# Patient Record
Sex: FEMALE | Race: WHITE
[De-identification: names, ages, dates, MRNs, and addresses within clinical notes are randomized per-mention and may not be internally consistent; named-entity substitution may affect disease eponyms.]

---

## 2022-11-02 ENCOUNTER — HOSPITAL ENCOUNTER (EMERGENCY)
Dept: HOSPITAL 75 - ER FS | Age: 77
Discharge: HOME | End: 2022-11-02
Payer: COMMERCIAL

## 2022-11-02 VITALS — HEIGHT: 62.99 IN | BODY MASS INDEX: 48.83 KG/M2 | WEIGHT: 275.58 LBS

## 2022-11-02 VITALS — SYSTOLIC BLOOD PRESSURE: 103 MMHG | DIASTOLIC BLOOD PRESSURE: 59 MMHG

## 2022-11-02 DIAGNOSIS — Z28.310: ICD-10-CM

## 2022-11-02 DIAGNOSIS — B37.2: Primary | ICD-10-CM

## 2022-11-02 LAB
APTT PPP: YELLOW S
BACTERIA #/AREA URNS HPF: NEGATIVE /HPF
BILIRUB UR QL STRIP: NEGATIVE
FIBRINOGEN PPP-MCNC: CLEAR MG/DL
GLUCOSE UR STRIP-MCNC: NEGATIVE MG/DL
HYALINE CASTS #/AREA URNS LPF: (no result) /LPF
KETONES UR QL STRIP: NEGATIVE
LEUKOCYTE ESTERASE UR QL STRIP: NEGATIVE
NITRITE UR QL STRIP: NEGATIVE
PH UR STRIP: 5 [PH] (ref 5–9)
PROT UR QL STRIP: NEGATIVE
RBC #/AREA URNS HPF: (no result) /HPF
SP GR UR STRIP: 1.01 (ref 1.02–1.02)
SQUAMOUS #/AREA URNS HPF: (no result) /HPF
WBC #/AREA URNS HPF: (no result) /HPF

## 2022-11-02 PROCEDURE — 81000 URINALYSIS NONAUTO W/SCOPE: CPT

## 2022-11-02 PROCEDURE — 99283 EMERGENCY DEPT VISIT LOW MDM: CPT

## 2022-11-02 NOTE — ED GU-FEMALE
General


Chief Complaint:   - Reproductive


Stated Complaint:  WEAKNESS,GI ISSUES


Nursing Triage Note:  


Pt presents with c/o bleeding with urination. Upon investigation pt has skin 


breakdown and wounds to buttock, herve, and genital regions. Wounds smell of 


yeast and are red and non blanching.


Source:  patient


Exam Limitations:  no limitations





History of Present Illness


Date Seen by Provider:  Nov 2, 2022


Time Seen by Provider:  19:30


Initial Comments


Patient is a 77-year-old female presents with intertrigo and bleeding around her

skin folds or groin.  Symptoms been ongoing for the past several days.  Patient 

with bright red and inflamed yeast infection with red swollen skin no fever 

chills or sweats.  No other symptoms or complaints


Timing/Duration:  just prior to arrival


Severity/Quality:  mild


Location:  other


Radiation:  other


Activities at Onset:  other


Sexual Glide History:  other


Modifying Factors:  Improves With Other


Associated Symptoms:  other





Allergies and Home Medications


Patient Home Medication List


Home Medication List Reviewed:  Yes





Review of Systems


Review of Systems


Constitutional:  see HPI


EENTM:  see HPI


Respiratory:  see HPI


Gastrointestinal:  see HPI


Genitourinary:  see HPI


Musculoskeletal:  see HPI


Skin:  see HPI


Psychiatric/Neurological:  See HPI


Endocrine:  See HPI


Hematologic/Lymphatic:  See HPI





All Other Systemes Reviewed


Negative Unless Noted:  No





Past Medical-Social-Family Hx


Patient Social History


Tobacco Use?:  No





Physical Exam


Vital Signs





Vital Signs - First Documented








 11/2/22





 19:25


 


Temp 36.7


 


Pulse 85


 


Resp 18


 


B/P (MAP) 102/78 (86)


 


Pulse Ox 100


 


O2 Delivery Room Air





Capillary Refill : Less Than 3 Seconds


Height, Weight, BMI


Height: '"


Weight: lbs. oz. kg; 48.00 BMI


Method:


General Appearance:  WD/WN, no apparent distress


HEENT:  PERRL/EOMI


Cardiovascular:  regular rate, rhythm


Respiratory:  lungs clear


Skin:  other (Intertrigo with yeast infection groin folds.)





Focused Exam


Sepsis Stage:  Ruled Out





Progress/Results/Core Measures


Suspected Sepsis


SIRS


Temperature: 


Pulse: 85 


Respiratory Rate: 18


 


Blood Pressure 102 /78 


Mean: 86





Results/Orders


Lab Results





Laboratory Tests








Test


 11/2/22


19:40 Range/Units


 








My Orders





Orders - IRENA BAPTISTE DO


Urinalysis (11/2/22 19:24)





Vital Signs/I&O











 11/2/22





 19:25


 


Temp 36.7


 


Pulse 85


 


Resp 18


 


B/P (MAP) 102/78 (86)


 


Pulse Ox 100


 


O2 Delivery Room Air





Capillary Refill : Less Than 3 Seconds








Blood Pressure Mean:                    86











Departure


Communication (Admissions)


Intertrigo





Impression





   Primary Impression:  


   Candidiasis, intertrigo


Disposition:  01 HOME, SELF-CARE


Condition:  Stable





Departure-Patient Inst.


Decision time for Depature:  20:03


Patient Instructions:  Intertrigo





Add. Discharge Instructions:  


Please keep areas clean and dry and covered.  Take newly prescribed medication 

as directed and follow-up with your PCP in 2 to 3 days for reevaluation.





All discharge instructions reviewed with patient and/or family. Voiced 

understanding.


Scripts


Fluconazole (Diflucan) 100 Mg Tablet


100 MG PO DAILY, #7 TAB


   Prov: IRENA BAPTISTE DO         11/2/22











IRENA BAPTISTE DO                    Nov 2, 2022 20:03

## 2023-04-04 ENCOUNTER — HOSPITAL ENCOUNTER (INPATIENT)
Dept: HOSPITAL 75 - ER FS | Age: 78
LOS: 3 days | Discharge: NURSING, REHAB OR LONG-TERM CARE | DRG: 871 | End: 2023-04-07
Attending: INTERNAL MEDICINE | Admitting: INTERNAL MEDICINE
Payer: MEDICAID

## 2023-04-04 VITALS — DIASTOLIC BLOOD PRESSURE: 63 MMHG | SYSTOLIC BLOOD PRESSURE: 122 MMHG

## 2023-04-04 VITALS — WEIGHT: 250.45 LBS | BODY MASS INDEX: 44.38 KG/M2 | HEIGHT: 62.99 IN

## 2023-04-04 DIAGNOSIS — L89.312: ICD-10-CM

## 2023-04-04 DIAGNOSIS — E78.00: ICD-10-CM

## 2023-04-04 DIAGNOSIS — F03.90: ICD-10-CM

## 2023-04-04 DIAGNOSIS — E86.0: ICD-10-CM

## 2023-04-04 DIAGNOSIS — R53.81: ICD-10-CM

## 2023-04-04 DIAGNOSIS — R65.21: ICD-10-CM

## 2023-04-04 DIAGNOSIS — M19.90: ICD-10-CM

## 2023-04-04 DIAGNOSIS — E83.42: ICD-10-CM

## 2023-04-04 DIAGNOSIS — M10.9: ICD-10-CM

## 2023-04-04 DIAGNOSIS — I82.412: ICD-10-CM

## 2023-04-04 DIAGNOSIS — N18.9: ICD-10-CM

## 2023-04-04 DIAGNOSIS — E66.9: ICD-10-CM

## 2023-04-04 DIAGNOSIS — A41.9: Primary | ICD-10-CM

## 2023-04-04 DIAGNOSIS — L03.116: ICD-10-CM

## 2023-04-04 DIAGNOSIS — L89.322: ICD-10-CM

## 2023-04-04 DIAGNOSIS — N17.9: ICD-10-CM

## 2023-04-04 LAB
ALBUMIN SERPL-MCNC: 3 GM/DL (ref 3.2–4.5)
ALP SERPL-CCNC: 194 U/L (ref 40–136)
ALT SERPL-CCNC: 10 U/L (ref 0–55)
APTT PPP: YELLOW S
BACTERIA #/AREA URNS HPF: NEGATIVE /HPF
BASOPHILS # BLD AUTO: 0.1 10^3/UL (ref 0–0.1)
BASOPHILS NFR BLD AUTO: 0 % (ref 0–10)
BASOPHILS NFR BLD MANUAL: 0 %
BILIRUB SERPL-MCNC: 1.1 MG/DL (ref 0.1–1)
BILIRUB UR QL STRIP: NEGATIVE
BUN/CREAT SERPL: 21
CALCIUM SERPL-MCNC: 8.8 MG/DL (ref 8.5–10.1)
CHLORIDE SERPL-SCNC: 100 MMOL/L (ref 98–107)
CO2 SERPL-SCNC: 27 MMOL/L (ref 21–32)
CREAT SERPL-MCNC: 2.41 MG/DL (ref 0.6–1.3)
EOSINOPHIL # BLD AUTO: 3.3 10^3/UL (ref 0–0.3)
EOSINOPHIL NFR BLD AUTO: 17 % (ref 0–10)
EOSINOPHIL NFR BLD MANUAL: 0 %
FIBRINOGEN PPP-MCNC: CLEAR MG/DL
GFR SERPLBLD BASED ON 1.73 SQ M-ARVRAT: 20 ML/MIN
GLUCOSE SERPL-MCNC: 142 MG/DL (ref 70–105)
GLUCOSE UR STRIP-MCNC: NEGATIVE MG/DL
HCT VFR BLD CALC: 35 % (ref 35–52)
HGB BLD-MCNC: 11.3 G/DL (ref 11.5–16)
KETONES UR QL STRIP: NEGATIVE
LEUKOCYTE ESTERASE UR QL STRIP: NEGATIVE
LYMPHOCYTES # BLD AUTO: 0.8 10^3/UL (ref 1–4)
LYMPHOCYTES NFR BLD AUTO: 4 % (ref 12–44)
MANUAL DIFFERENTIAL PERFORMED BLD QL: YES
MCH RBC QN AUTO: 29 PG (ref 25–34)
MCHC RBC AUTO-ENTMCNC: 32 G/DL (ref 32–36)
MCV RBC AUTO: 91 FL (ref 80–99)
MONOCYTES # BLD AUTO: 2.3 10^3/UL (ref 0–1)
MONOCYTES NFR BLD AUTO: 12 % (ref 0–12)
MONOCYTES NFR BLD: 6 %
NEUTROPHILS # BLD AUTO: 12.5 10^3/UL (ref 1.8–7.8)
NEUTROPHILS NFR BLD AUTO: 66 % (ref 42–75)
NEUTS BAND NFR BLD MANUAL: 87 %
NEUTS BAND NFR BLD: 3 %
NITRITE UR QL STRIP: NEGATIVE
PH UR STRIP: 5.5 [PH] (ref 5–9)
PLATELET # BLD: 270 10^3/UL (ref 130–400)
PMV BLD AUTO: 9.7 FL (ref 9–12.2)
POTASSIUM SERPL-SCNC: 4.2 MMOL/L (ref 3.6–5)
PROT SERPL-MCNC: 6.6 GM/DL (ref 6.4–8.2)
PROT UR QL STRIP: NEGATIVE
RBC #/AREA URNS HPF: (no result) /HPF
SODIUM SERPL-SCNC: 140 MMOL/L (ref 135–145)
SP GR UR STRIP: 1.02 (ref 1.02–1.02)
SQUAMOUS #/AREA URNS HPF: (no result) /HPF
VARIANT LYMPHS NFR BLD MANUAL: 4 %
WBC # BLD AUTO: 19 10^3/UL (ref 4.3–11)
WBC #/AREA URNS HPF: (no result) /HPF

## 2023-04-04 PROCEDURE — 82947 ASSAY GLUCOSE BLOOD QUANT: CPT

## 2023-04-04 PROCEDURE — 83735 ASSAY OF MAGNESIUM: CPT

## 2023-04-04 PROCEDURE — 73552 X-RAY EXAM OF FEMUR 2/>: CPT

## 2023-04-04 PROCEDURE — 81000 URINALYSIS NONAUTO W/SCOPE: CPT

## 2023-04-04 PROCEDURE — 87040 BLOOD CULTURE FOR BACTERIA: CPT

## 2023-04-04 PROCEDURE — 82533 TOTAL CORTISOL: CPT

## 2023-04-04 PROCEDURE — 86141 C-REACTIVE PROTEIN HS: CPT

## 2023-04-04 PROCEDURE — 80202 ASSAY OF VANCOMYCIN: CPT

## 2023-04-04 PROCEDURE — 85007 BL SMEAR W/DIFF WBC COUNT: CPT

## 2023-04-04 PROCEDURE — 85027 COMPLETE CBC AUTOMATED: CPT

## 2023-04-04 PROCEDURE — 87081 CULTURE SCREEN ONLY: CPT

## 2023-04-04 PROCEDURE — 82805 BLOOD GASES W/O2 SATURATION: CPT

## 2023-04-04 PROCEDURE — 73590 X-RAY EXAM OF LOWER LEG: CPT

## 2023-04-04 PROCEDURE — 36600 WITHDRAWAL OF ARTERIAL BLOOD: CPT

## 2023-04-04 PROCEDURE — 36415 COLL VENOUS BLD VENIPUNCTURE: CPT

## 2023-04-04 PROCEDURE — 84100 ASSAY OF PHOSPHORUS: CPT

## 2023-04-04 PROCEDURE — 71045 X-RAY EXAM CHEST 1 VIEW: CPT

## 2023-04-04 PROCEDURE — 80053 COMPREHEN METABOLIC PANEL: CPT

## 2023-04-04 PROCEDURE — 83605 ASSAY OF LACTIC ACID: CPT

## 2023-04-04 PROCEDURE — 85025 COMPLETE CBC W/AUTO DIFF WBC: CPT

## 2023-04-04 PROCEDURE — 51702 INSERT TEMP BLADDER CATH: CPT

## 2023-04-04 PROCEDURE — 87186 SC STD MICRODIL/AGAR DIL: CPT

## 2023-04-04 PROCEDURE — 84443 ASSAY THYROID STIM HORMONE: CPT

## 2023-04-04 RX ADMIN — Medication SCH MLS/HR: at 16:43

## 2023-04-04 RX ADMIN — SODIUM CHLORIDE SCH MLS/HR: 900 INJECTION, SOLUTION INTRAVENOUS at 18:21

## 2023-04-04 RX ADMIN — SODIUM CHLORIDE SCH MLS/HR: 900 INJECTION INTRAVENOUS at 20:23

## 2023-04-04 RX ADMIN — DOCUSATE SODIUM SCH MG: 100 CAPSULE ORAL at 20:23

## 2023-04-04 RX ADMIN — DOCUSATE SODIUM SCH MG: 100 CAPSULE ORAL at 20:26

## 2023-04-04 NOTE — DIAGNOSTIC IMAGING REPORT
CLINICAL HISTORY: Left leg pain.



COMPARISON: None.



TECHNIQUE: 2 views of the left tibia and fibula.



FINDINGS: 

There is no acute fracture or dislocation of the left tibia and

fibula.  Alignment is anatomic.  The imaged joint spaces are

preserved. No focal osseous lesions are seen. There is

generalized soft tissue edema in the left lower extremity.



IMPRESSION: 

1. No acute fracture or dislocation in the left tibia and fibula.

2. Soft tissue edema in the left lower extremity.



Dictated by: 



  Dictated on workstation # [id number]

## 2023-04-04 NOTE — TELE-ICU CONSULT
History of Present Illness


History of Present Illness


Date Seen by Provider:  Apr 4, 2023


Time Seen by Provider:  17:18


Date of Admission





(Tele-ICU Physician ,   consultation as per request of PCP 


Service provided via interactive audio and video telecommunications  [facility] 

system to a patient admitted to ICU bed in [facility].








Available chart/ vitals / labs / Images reviewed


H&P is from ER notes


Patient's information available about PMH, Shx, Fhx   allergy reviewed inEMR.


ROS as per chart and RN report





Now in ICU, hemodynamically stable


Video assessment done using   teleICU camera, rest of exam as per RN


Discussed with RN.








Hospital course:


(4/4) 78F Admitted from [facility] for cellulitis LLE, non-occlusive DVT LLE,

decubitus ulcer stage II to gluteal cleft. BP low on arrival to ICU








A/P


Sepsis  with shock ( sourses: cellulitis , decubitus ulcer .  UA is clear , no 

cxr done 


- received  IVF total of 1 Liters of NS, ongoing 2nd  1L NS , also in levo ( 

periph line 


- abx started - Vancomycin and Zosyn, cx done in ER - pending 


- will reassess after NS bolus , night need  central line 





Cellulitis 


- cont abx 





ADRIANA


- hydration to cont


- scherer in place  - UO  minimal , monitor after NS bolus 





Acute DVT  LEFT , moderate burden , nonocclusive clots


- lovenox  full  dose , started in ER  - adjust to Cr 





Lines :    periph   , (Central Line Necessity Reviewed)


Scherer:  4/4 


OG:


Nutrition: po 


Analgesia:


Anxiety/ delirium 








VTE Prophylaxis: lov full dose 


Stress Ulcer Prophylaxis:  po 








Plans in collaboration with   bedside consultants and IM MDs.


Discussed with RN to reach out if any questions or concerns


A total of 32  minutes of critical care time was devoted to this patient today, 

required to treat and/or prevent further deterioration of  critical care c

ondition ( as above ) .





I am remotely monitoring this patient from another state.  I am unable to do the

bedside exam, and history/physical and pertinent information is taken from other

notes in the computer and bedside staff. .





Allergies and Home Medications


Allergies


Coded Allergies:  


     No Known Drug Allergies (Unverified , 11/2/22)





Home Medications


Fluconazole 100 Mg Tablet, 100 MG PO DAILY


   Prescribed by: IRENA BAPTISTE on 11/2/22 2008


Hydrocodone/Acetaminophen 5 Mg-325 Mg Tablet, 1 TAB PO Q4H PRN for PAIN-MODERATE

(5-7)


   Prescribed by: IRENA BAPTISTE on 12/3/22 1007


Hydrocodone/Acetaminophen 5 Mg-325 Mg Tablet, 1 TAB PO Q4H PRN for PAIN-MODERATE

(5-7)


   Prescribed by: IRENA BAPTISTE on 11/2/22 2027





Past Medical/Social/Family Hx


Patient Social History


Tobacco Use?:  No


Substance use?:  No


Alcohol Use?:  No


Pt stated abuse/neglect:  No





Immunizations Up To Date


Influenza Vaccine Up-to-Date:  Yes; Up-to-Date


Tetanus Booster (TDap):  Unknown





Current Status


Advance Directives:  Unable to obtain


Communicates:  Verbally


Primary Language:  English


Preferred Spoken Language:  English


Is interpretation needed?:  No


Sensory deficits:  Vision impairment


Implanted or [employer] D:  None





Review of Systems


Constitutional:  see HPI





Focused Exam


Sepsis Stage:  Septic Shock


Lactate Level


4/4/23 14:10: Lactic Acid Level 1.62


Height, Weight, BMI


Height: '"


Weight: lbs. oz. kg; 42.14 BMI


Method:


Respiratory:  Lungs Clear, Decreased Breath Sounds


Cardiovascular:  Regular Rate, Rhythm, Other


Lactic Acid Level





Laboratory Tests








Test


 4/4/23


14:10


 


Lactic Acid Level


 1.62 MMOL/L


(0.50-2.00)








Within 3hrs of presentation:  Admin fluids, Admin ABX, Blood cultures prior to 

ABX's, Focus exam, Lactate level, Vasopressin therapy





Exam


Exam


Patient acknowledged, consented, and participated in this virtual visit which 

was conducted using real time audio/video


Vital Signs








  Date Time  Temp Pulse Resp B/P (MAP) Pulse Ox O2 Delivery O2 Flow Rate FiO2


 


4/4/23 16:43 37.0       


 


4/4/23 16:43    77/40    


 


4/4/23 15:25  96 18 114/91 99 Room Air  


 


4/4/23 12:18 37.6 113 20 112/73 (86) 95 Room Air  








Height & Weight


Height: '"


Weight: lbs. oz. kg; 42.14 BMI


Method:


General Appearance:  No Apparent Distress


Capillary Refill:  Less Than 3 Seconds


Gastrointestinal:  normal bowel sounds, non tender, soft, no pulsatile mass





Results


Lab


Laboratory Tests


4/4/23 12:55











Assessment/Plan


Assessment/Plan


1











THIERNO CARRANZA MD          Apr 4, 2023 17:19

## 2023-04-04 NOTE — DIAGNOSTIC IMAGING REPORT
CLINICAL HISTORY: Left leg pain and swelling. Injury.



COMPARISON: None.



TECHNIQUE: 3 views of the left femur.



FINDINGS: 

There is no acute fracture or dislocation of the left femur. 

Alignment is anatomic.  The imaged joint spaces are preserved. No

suspicious focal osseous lesion. There is generalized edema in

the left lower extremity.



IMPRESSION: 

1. No acute fracture or dislocation in the left femur. Please

note the left knee is not well characterized on this exam. If

there is localized left knee pain consider dedicated radiographs

of the left knee to further evaluate.

2. Edema in the visualized left lower extremity.



Dictated by: 



  Dictated on workstation # [id number]

## 2023-04-04 NOTE — ED LOWER EXTREMITY
General


Chief Complaint:  Lower Extremity


Stated Complaint:  LEFT KNEE/LEG PAIN


Nursing Triage Note:  


Patient presents to the ED by EMS for chief complaint of left lower extremity 


redness, swelling, and pain. Patient lives at home alone with caregiver support 


during the day according to patient's niece. Patient's niece states patient was 


unable to ambulate or transfer this morning due to the pain. Niece states 


patient has had gout in the past, but was unable to refill her gout medication 


without seeing her PCP.


Source:  patient





History of Present Illness


Date Seen by Provider:  2023


Time Seen by Provider:  12:14


Initial Comments


78-year-old female presenting by EMS from home due to severe left leg pain and 

swelling.  Family has not come to check on her and reported that she was having 

so much pain that she could not get up and walk so they had called EMS.  They 

had called the [facility] clinic to request a refill on medication for gout but had not

had it approved through the clinic yet.  The niece states that patient has had 

gout in the past and they were thinking that might be why she was having the 

pain.  Patient gets around with a walker and lives at home on her own but has 

family to check on her and provide caregiver support. She follows with PARMJIT Du, through [facility] clinic. Daughter reports she has a sore on her bottom 

and the daughter has been applying a barrier ointment to the area.


Onset:  this morning (severe left leg pain)


Pain/Injury Location:  left leg, left knee, left foot, left ankle


Method of Injury:  unknown


Modifying Factors:  Worse With Movement (with any movement or palpation of left 

leg she screams out in pain)





Allergies and Home Medications


Allergies


Coded Allergies:  


     No Known Drug Allergies (Unverified , 22)





Patient Home Medication List


Home Medication List Reviewed:  Yes


Fluconazole (Diflucan) 100 Mg Tablet, 100 MG PO DAILY


   Prescribed by: IRENA BAPTISTE on 22


Hydrocodone/Acetaminophen (Hydrocodone-Acetamin 5-325 mg) 5 Mg-325 Mg Tablet, 1 

TAB PO Q4H PRN for PAIN-MODERATE (5-7)


   Prescribed by: IRENA BAPTISTE on 12/3/22 1007


Hydrocodone/Acetaminophen (Hydrocodone-Acetamin 5-325 mg) 5 Mg-325 Mg Tablet, 1 

TAB PO Q4H PRN for PAIN-MODERATE (5-7)


   Prescribed by: IRENA BAPTISTE on 22





Review of Systems


Constitutional:  No chills, No fever; malaise, weakness


EENTM:  no symptoms reported


Respiratory:  no symptoms reported


Cardiovascular:  no symptoms reported


Gastrointestinal:  no symptoms reported


Genitourinary:  No dysuria


Musculoskeletal:  see HPI


Skin:  change in color (mild erythema to left foot and calf)


Psychiatric/Neurological:  Anxiety





Past Medical-Social-Family Hx


Patient Social History


Tobacco Use?:  No


Substance use?:  No


Alcohol Use?:  No


Pt feels they are or have been:  No





Past Medical History


Surgery/Hospitalization HX:  


Gout, HTN, Hypothyroid, Hyperlipidemia, Osteoarthritis, Obesity





Physical Exam


Vital Signs





Vital Signs - First Documented








 23





 12:18


 


Temp 37.6


 


Pulse 113


 


Resp 20


 


B/P (MAP) 112/73 (86)


 


Pulse Ox 95


 


O2 Delivery Room Air





Capillary Refill : Less Than 3 Seconds


Height, Weight, BMI


Height: '"


Weight: lbs. oz. kg; 48.00 BMI


Method:


General Appearance:  obese, other (disheveled and poor personal hygiene)


HEENT:  PERRL/EOMI; No pharynx normal (slightly dry mucous membranes)


Neck:  non-tender, full range of motion, supple


Cardiovascular:  normal peripheral pulses, regular rate, rhythm


Respiratory:  chest non-tender, lungs clear, normal breath sounds


Gastrointestinal:  normal bowel sounds, non tender, soft, no pulsatile mass


Hips:  bilateral hip non-tender, bilateral hip normal inspection; left hip pain 

(pain with any movement or palpation of left leg )


Legs:  left leg pain, left leg soft tissue tenderness, left leg swelling


Knees:  left knee pain, left knee soft tissue tenderness, left knee swelling


Ankles:  left ankle pain, left ankle soft tissue tenderness, left ankle swelling


Feet:  left foot pain, left foot soft tissue tenderness, left foot swelling


Neurologic/Psychiatric:  alert, oriented x 3


Skin:  warm/dry, other (increased erythema to Left lower extremity and tender to

palpation and movement of left leg. Stage 2 Decubitus ulcer to gluteal cleft 

with erythema and friable tissue )





Progress/Results/Core Measures


Results/Orders


Lab Results





Laboratory Tests








Test


 23


12:55 23


13:40 23


14:10 Range/Units


 


 


White Blood Count


 19.0 H


 


 


 4.3-11.0


10^3/uL


 


Red Blood Count


 3.89 


 


 


 3.80-5.11


10^6/uL


 


Hemoglobin 11.3 L   11.5-16.0  g/dL


 


Hematocrit 35    35-52  %


 


Mean Corpuscular Volume 91    80-99  fL


 


Mean Corpuscular Hemoglobin 29    25-34  pg


 


Mean Corpuscular Hemoglobin


Concent 32 


 


 


 32-36  g/dL





 


Red Cell Distribution Width 15.4 H   10.0-14.5  %


 


Platelet Count


 270 


 


 


 130-400


10^3/uL


 


Mean Platelet Volume 9.7    9.0-12.2  fL


 


Immature Granulocyte % (Auto) 1     %


 


Neutrophils (%) (Auto) 66    42-75  %


 


Lymphocytes (%) (Auto) 4 L   12-44  %


 


Monocytes (%) (Auto) 12    0-12  %


 


Eosinophils (%) (Auto) 17 H   0-10  %


 


Basophils (%) (Auto) 0    0-10  %


 


Neutrophils # (Auto)


 12.5 H


 


 


 1.8-7.8


10^3/uL


 


Lymphocytes # (Auto)


 0.8 L


 


 


 1.0-4.0


10^3/uL


 


Monocytes # (Auto)


 2.3 H


 


 


 0.0-1.0


10^3/uL


 


Eosinophils # (Auto)


 3.3 H


 


 


 0.0-0.3


10^3/uL


 


Basophils # (Auto)


 0.1 


 


 


 0.0-0.1


10^3/uL


 


Immature Granulocyte # (Auto)


 0.1 


 


 


 0.0-0.1


10^3/uL


 


Neutrophils % (Manual) 87     %


 


Lymphocytes % (Manual) 4     %


 


Monocytes % (Manual) 6     %


 


Eosinophils % (Manual) 0     %


 


Basophils % (Manual) 0     %


 


Band Neutrophils 3     %


 


Sodium Level 140    135-145  MMOL/L


 


Potassium Level 4.2    3.6-5.0  MMOL/L


 


Chloride Level 100      MMOL/L


 


Carbon Dioxide Level 27    21-32  MMOL/L


 


Anion Gap 13    5-14  MMOL/L


 


Blood Urea Nitrogen 51 H   7-18  MG/DL


 


Creatinine


 2.41 H


 


 


 0.60-1.30


MG/DL


 


Estimat Glomerular Filtration


Rate 20 


 


 


  





 


BUN/Creatinine Ratio 21     


 


Glucose Level 142 H     MG/DL


 


Calcium Level 8.8    8.5-10.1  MG/DL


 


Corrected Calcium 9.6    8.5-10.1  MG/DL


 


Total Bilirubin 1.1 H   0.1-1.0  MG/DL


 


Aspartate Amino Transf


(AST/SGOT) 20 


 


 


 5-34  U/L





 


Alanine Aminotransferase


(ALT/SGPT) 10 


 


 


 0-55  U/L





 


Alkaline Phosphatase 194 H     U/L


 


C-Reactive Protein 16.17 H   <0.50  MG/DL


 


Total Protein 6.6    6.4-8.2  GM/DL


 


Albumin 3.0 L   3.2-4.5  GM/DL


 


Urine Color  YELLOW    


 


Urine Clarity  CLEAR    


 


Urine pH  5.5   5-9  


 


Urine Specific Gravity  1.020   1.016-1.022  


 


Urine Protein  NEGATIVE   NEGATIVE  


 


Urine Glucose (UA)  NEGATIVE   NEGATIVE  


 


Urine Ketones  NEGATIVE   NEGATIVE  


 


Urine Nitrite  NEGATIVE   NEGATIVE  


 


Urine Bilirubin  NEGATIVE   NEGATIVE  


 


Urine Urobilinogen  0.2   < = 1.0  MG/DL


 


Urine Leukocyte Esterase  NEGATIVE   NEGATIVE  


 


Urine RBC (Auto)  NEGATIVE   NEGATIVE  


 


Urine RBC  NONE    /HPF


 


Urine WBC  0-2    /HPF


 


Urine Squamous Epithelial


Cells 


 NONE 


 


  /HPF





 


Urine Crystals  NONE    /LPF


 


Urine Bacteria  NEGATIVE    /HPF


 


Urine Casts  NONE    /LPF


 


Urine Mucus  NEGATIVE    /LPF


 


Urine Culture Indicated  NO    


 


Lactic Acid Level


 


 


 1.62 


 0.50-2.00


MMOL/L








My Orders





Orders - TAZ GRANDA MD


Cbc With Automated Diff (23 12:24)


Comprehensive Metabolic Panel (23 12:24)


Ua Culture If Indicated (23 12:24)


Ed Iv/Invasive Line Start (23 12:24)


Crp Fs (23 12:24)


Tibia Fibula 2 View Left (23 12:24)


Femur 2 View Left (23 12:24)


Us Venous Lower Ext Lt (23 12:24)


Ketorolac Injection (Toradol Injection) (23 12:28)


Manual Differential (23 12:55)


Blood Culture (23 13:52)


Lactic Acid Analyzer (23 13:52)


Piperacillin Sodium/Tazobactam (Zosyn Vi (23 13:54)


Vancomycin Injection (Vancomycin Injecti (23 13:54)


Ns Iv 1000 Ml (Sodium Chloride 0.9%) (23 14:06)


Ns Iv 1000 Ml (Sodium Chloride 0.9%) (23 14:43)


Enoxaparin Injection (Lovenox Injection) (23 14:43)


Kwan Cath (23 15:15)





Medications Given in ED





Current Medications








 Medications  Dose


 Ordered  Sig/Martina


 Route  Start Time


 Stop Time Status Last Admin


Dose Admin


 


 Sodium Chloride  1,000 ml @ 


 ud  STK-MED ONCE


 .ROUTE  23 14:06


 23 14:10 DC 23 14:27


1,000 MLS/HR








Vital Signs/I&O











 23





 12:18 15:25


 


Temp 37.6 


 


Pulse 113 96


 


Resp 20 18


 


B/P (MAP) 112/73 (86) 114/91


 


Pulse Ox 95 99


 


O2 Delivery Room Air Room Air














Blood Pressure Mean:                    86











Admisison Planning


May Need Admission (Planning):  12:30





Progress


Progress Note #1:  


Progress Note


Potential diagnosis of left hip fracture, arthritis of the left knee, 

cellulitis, DVT, ankle fracture, tibia/fibula fracture, sepsis, UTI, decubitus 

ulcer infection.





Peripheral IV access for fluids and pain medicine.  Obtain labs to evaluate her 

complete blood count, comprehensive metabolic profile, CRP.  X-rays of the left 

femur and left tib-fib to evaluate for possible acute bony abnormality as she 

could be having referred pain from hip fracture or could have fractured the left

lower leg to cause her pain to the point that she was not getting up and using 

her walker.  Since she does have increased swelling and mild erythema to the 

left lower extremity as well as pain with palpation will order ultrasound to 

evaluate for possible DVT.  Place Kwan catheter to monitor urine output and 

evaluate for UTI as well as due to pain with any movement of the left leg. Order

dose of Toradol 15 mg IV to try and help with pain and inflammation.


Progress Note #2:  


   Time:  13:39


Progress Note


Complete blood count shows elevated white blood cells to 19,000.  She has mild 

anemia with a hemoglobin of 11.3.  Normal platelets of 270.  For her 

differential she had 87% neutrophils and 3% bands.  She had comprehensive 

metabolic profile that showed acute kidney injury with BUN of 51 and creatinine 

of 2.41.  Her glucose was slightly elevated to 142.  Awaiting CRP.  We will add 

on blood cultures and lactic acid since she has a high white blood cell count.  

Potential source from decubitus ulcer, UTI, cellulitis.





Called and discussed with Dr. Meyers on-call hospitalist for [facility].  With the 

patient having severe pain with any movement or palpation in the left leg as 

well as having a DVT found on ultrasound with no bony injury on x-rays and 

possible infection from her decubitus ulcer, she was agreeable to having patient

be admitted as observation patient for hydration, antibiotics and DVT treatment.

Will order a dose of Lovenox of 120 mg x 1. 





1352 After i had initially spoken with Dr. Tripp the patient's blood pressures 

were reading low at 70-85 systolic. Patient still alert and awake and able to 

answer questions. She wanted to change the patient to ICU admit and full 

admission in light of these findings. Make sure to get blood cultures and lactic

acid prior to starting antibiotics. Start on Vancomycin and Zosyn for broad 

spectrum coverage.





I updated patient and family member. They were agreeable to admit in [place]. 

Ordered IVF total of 2 Liters of NS for blood pressure and hydration.


Progress Note #3:  


   Time:  15:06


Progress Note


CRP elevated to 16.17 but Lactic acid not elevated at 1.62. With IVF hydration 

blood pressure up to 114/67. continue with ICU admit to [facility].





Diagnostic Imaging





   Diagonstic Imaging:  Xray


   Plain Films/CT/US/NM/MRI:  femur


Comments


                 [facility] [place], [place]





NAME:   SPIKE GARCIA


[facility] REC#:   [id number]


ACCOUNT#:   [id number]


PT STATUS:   REG ER


:   1945


PHYSICIAN:   TAZ GRANDA MD


ADMIT DATE:   23/ER FS


                                  ***Signed***


Date of Exam:23





FEMUR 2 VIEW LEFT








CLINICAL HISTORY: Left leg pain and swelling. Injury.





COMPARISON: None.





TECHNIQUE: 3 views of the left femur.





FINDINGS: 


There is no acute fracture or dislocation of the left femur. 


Alignment is anatomic.  The imaged joint spaces are preserved. No


suspicious focal osseous lesion. There is generalized edema in


the left lower extremity.





IMPRESSION: 


1. No acute fracture or dislocation in the left femur. Please


note the left knee is not well characterized on this exam. If


there is localized left knee pain consider dedicated radiographs


of the left knee to further evaluate.


2. Edema in the visualized left lower extremity.





Dictated by: 





  Dictated on workstation # [id number]








Dict:   23 1301


Trans:   23 1306


DEV [id number]





Interpreted by:     SOCORRO FORD DO


Electronically signed by: SOCORRO FORD DO 23 130








   Diagonstic Imaging:  Xray


   Plain Films/CT/US/NM/MRI:  leg


Comments


                 [facility] [place], [place]





NAME:   SPIKE GARCIA


[facility] REC#:   [id number]


ACCOUNT#:   [id number]


PT STATUS:   REG ER


:   1945


PHYSICIAN:   TAZ GRANDA MD


ADMIT DATE:   23/ER FS


                                  ***Signed***


Date of Exam:23





TIBIA FIBULA 2 VIEW LEFT








CLINICAL HISTORY: Left leg pain.





COMPARISON: None.





TECHNIQUE: 2 views of the left tibia and fibula.





FINDINGS: 


There is no acute fracture or dislocation of the left tibia and


fibula.  Alignment is anatomic.  The imaged joint spaces are


preserved. No focal osseous lesions are seen. There is


generalized soft tissue edema in the left lower extremity.





IMPRESSION: 


1. No acute fracture or dislocation in the left tibia and fibula.


2. Soft tissue edema in the left lower extremity.





Dictated by: 





  Dictated on workstation # [id number]








Dict:   23 1259


Trans:   23 1303


DEV [id number]





Interpreted by:     SOCORRO FORD DO


Electronically signed by: SOCORRO FORD DO 23 1303


   Reviewed:  Reviewed by Me








   Diagonstic Imaging:  Ultrasound


   Plain Films/CT/US/NM/MRI:  leg


Comments


                 [facility] [place], [place]





NAME:   SPIKE GARCIA


[facility] REC#:   [id number]


ACCOUNT#:   [id number]


PT STATUS:   REG ER


:   1945


PHYSICIAN:   TAZ GRANDA MD


ADMIT DATE:   23/ER FS


                                  ***Signed***


Date of Exam:23





US VENOUS LOWER EXT LT








EXAMINATION: US Lower Extremity Venous Duplex Left.





TECHNIQUE: Multiple real-time grayscale images were obtained over


the left lower extremity in various projections. Additional


spectral analysis and color Doppler duplex images were also


obtained. 





HISTORY:  Left lower extremity pain and edema.





COMPARISON: None available.





FINDINGS: 





There is a nonocclusive thrombus within the left common femoral


vein which demonstrates partial compressibility. There is normal


color Doppler filling and compressibility within the profunda


femoris vein and proximal portion of the left superficial femoral


vein. Nonocclusive thrombus is visualized in the mid and distal


left superficial femoral vein and left popliteal vein. There is


occlusion of the left peroneal trunk.





IMPRESSION:





1. Moderate burden of nonocclusive deep vein thrombus in the left


lower extremity venous system.





Dictated by: 





  Dictated on workstation # [id number]








Dict:   23 1335


Trans:   23 1344


[facility] [id number]





Interpreted by:     SOCORRO FORD DO


Electronically signed by: SOCORRO FORD DO 23 1344


   Reviewed:  Reviewed by Me





Critical Care Note


Critical Care


Total Time (minutes)


45 minutes


Progress


I spent at least 45 minutes of critical care time with the patient.  Time 

excludes separately billable procedures.  Time was spent obtaining history from 

patient and family members, ordering tests and reviewing results, ordering 

interventions and reviewing response, discussion with consultants, documentation

in the chart.  Patient was at risk of cardiovascular compromise and collapse 

with sepsis and DVT.  She required immediate and direct care to help stabilize 

her condition and arrange transfer to higher level of care.





Departure


Communication (Admissions)


Time/Spoke to Admitting Phy:  13:52


discussed with Dr. Tripp that patient has low blood pressure and findings for 

possible sepsis with septic shock due to her pressures. Potential source of 

infection as Decubitus ulcer vs possible cellulitis LLE, DVT LLE. Labs show 

elevated WBC count and Acute kidney injury with dehydration. Will place patient 

as full admit to ICU with IV antibiotics of Zosyn 4.5 grams IV and Vancomycin 1 

gram IV. Give IVF for hydration and to help with blood pressure.





Impression





   Primary Impression:  


   Sepsis


   Qualified Codes:  A41.9 - Sepsis, unspecified organism; R65.21 - Severe 

   sepsis with septic shock; N17.9 - Acute kidney failure, unspecified


   Additional Impressions:  


   Deep vein thrombosis (DVT) of left lower extremity


   Qualified Codes:  I82.492 - Acute embolism and thrombosis of other specified 

   deep vein of left lower extremity


   Decubitus ulcer of buttock


   Qualified Codes:  L89.302 - Pressure ulcer of unspecified buttock, stage 2


   Dehydration


   Acute kidney injury (nontraumatic)


Disposition:  30 STILL A PATIENT


Condition:  Critical





Admissions


Decision to Admit Reason:  Admit from ER (General)


Decision to Admit/Date:  2023


Time/Decision to Admit Time:  13:52





Departure-Patient Inst.


Referrals:  


BASIL JOSEPH APRN (PCP/Family)


Primary Care Physician











TAZ GRANDA MD                2023 14:07

## 2023-04-04 NOTE — DIAGNOSTIC IMAGING REPORT
EXAMINATION: US Lower Extremity Venous Duplex Left.



TECHNIQUE: Multiple real-time grayscale images were obtained over

the left lower extremity in various projections. Additional

spectral analysis and color Doppler duplex images were also

obtained. 



HISTORY:  Left lower extremity pain and edema.



COMPARISON: None available.



FINDINGS: 



There is a nonocclusive thrombus within the left common femoral

vein which demonstrates partial compressibility. There is normal

color Doppler filling and compressibility within the profunda

femoris vein and proximal portion of the left superficial femoral

vein. Nonocclusive thrombus is visualized in the mid and distal

left superficial femoral vein and left popliteal vein. There is

occlusion of the left peroneal trunk.



IMPRESSION:



1. Moderate burden of nonocclusive deep vein thrombus in the left

lower extremity venous system.



Dictated by: 



  Dictated on workstation # [id number]

## 2023-04-05 VITALS — SYSTOLIC BLOOD PRESSURE: 102 MMHG | DIASTOLIC BLOOD PRESSURE: 50 MMHG

## 2023-04-05 VITALS — SYSTOLIC BLOOD PRESSURE: 86 MMHG | DIASTOLIC BLOOD PRESSURE: 45 MMHG

## 2023-04-05 VITALS — DIASTOLIC BLOOD PRESSURE: 64 MMHG | SYSTOLIC BLOOD PRESSURE: 96 MMHG

## 2023-04-05 LAB
ALBUMIN SERPL-MCNC: 2.4 GM/DL (ref 3.2–4.5)
ALP SERPL-CCNC: 177 U/L (ref 40–136)
ALT SERPL-CCNC: 14 U/L (ref 0–55)
ARTERIAL PATENCY WRIST A: (no result)
BASE EXCESS STD BLDA CALC-SCNC: 0.2 MMOL/L (ref -2.5–2.5)
BASOPHILS # BLD AUTO: 0 10^3/UL (ref 0–0.1)
BASOPHILS NFR BLD AUTO: 0 % (ref 0–10)
BDY SITE: (no result)
BILIRUB SERPL-MCNC: 1 MG/DL (ref 0.1–1)
BODY TEMPERATURE: 36.8
BUN/CREAT SERPL: 23
CALCIUM SERPL-MCNC: 7.6 MG/DL (ref 8.5–10.1)
CHLORIDE SERPL-SCNC: 104 MMOL/L (ref 98–107)
CO2 BLDA CALC-SCNC: 25.5 MMOL/L (ref 21–31)
CO2 SERPL-SCNC: 20 MMOL/L (ref 21–32)
CREAT SERPL-MCNC: 2.11 MG/DL (ref 0.6–1.3)
EOSINOPHIL # BLD AUTO: 0.2 10^3/UL (ref 0–0.3)
EOSINOPHIL NFR BLD AUTO: 1 % (ref 0–10)
GFR SERPLBLD BASED ON 1.73 SQ M-ARVRAT: 23 ML/MIN
GLUCOSE SERPL-MCNC: 127 MG/DL (ref 70–105)
HCT VFR BLD CALC: 33 % (ref 35–52)
HGB BLD-MCNC: 10.6 G/DL (ref 11.5–16)
INHALED O2 FLOW RATE: (no result) L/MIN
LYMPHOCYTES # BLD AUTO: 1.1 10^3/UL (ref 1–4)
LYMPHOCYTES NFR BLD AUTO: 7 % (ref 12–44)
MAGNESIUM SERPL-MCNC: 1.1 MG/DL (ref 1.6–2.4)
MANUAL DIFFERENTIAL PERFORMED BLD QL: NO
MCH RBC QN AUTO: 30 PG (ref 25–34)
MCHC RBC AUTO-ENTMCNC: 33 G/DL (ref 32–36)
MCV RBC AUTO: 91 FL (ref 80–99)
MONOCYTES # BLD AUTO: 1.9 10^3/UL (ref 0–1)
MONOCYTES NFR BLD AUTO: 12 % (ref 0–12)
NEUTROPHILS # BLD AUTO: 12.9 10^3/UL (ref 1.8–7.8)
NEUTROPHILS NFR BLD AUTO: 80 % (ref 42–75)
PCO2 BLDA: 39 MMHG (ref 35–45)
PH BLDA: 7.41 [PH] (ref 7.37–7.43)
PHOSPHATE SERPL-MCNC: 3.2 MG/DL (ref 2.3–4.7)
PLATELET # BLD: 192 10^3/UL (ref 130–400)
PMV BLD AUTO: 10.2 FL (ref 9–12.2)
PO2 BLDA: 134 MMHG (ref 79–93)
POTASSIUM SERPL-SCNC: 3.9 MMOL/L (ref 3.6–5)
PROT SERPL-MCNC: 5 GM/DL (ref 6.4–8.2)
SAO2 % BLDA FROM PO2: 99 % (ref 94–100)
SODIUM SERPL-SCNC: 135 MMOL/L (ref 135–145)
VENTILATION MODE VENT: NO
WBC # BLD AUTO: 16.2 10^3/UL (ref 4.3–11)

## 2023-04-05 RX ADMIN — SODIUM CHLORIDE SCH MLS/HR: 900 INJECTION INTRAVENOUS at 20:53

## 2023-04-05 RX ADMIN — DOCUSATE SODIUM SCH MG: 100 CAPSULE ORAL at 09:14

## 2023-04-05 RX ADMIN — SODIUM CHLORIDE SCH MLS/HR: 900 INJECTION, SOLUTION INTRAVENOUS at 10:43

## 2023-04-05 RX ADMIN — MAGNESIUM SULFATE IN DEXTROSE SCH MLS/HR: 10 INJECTION, SOLUTION INTRAVENOUS at 07:55

## 2023-04-05 RX ADMIN — DOCUSATE SODIUM SCH MG: 100 CAPSULE ORAL at 20:53

## 2023-04-05 RX ADMIN — MAGNESIUM SULFATE IN DEXTROSE SCH MLS/HR: 10 INJECTION, SOLUTION INTRAVENOUS at 13:11

## 2023-04-05 RX ADMIN — MAGNESIUM SULFATE IN DEXTROSE SCH MLS/HR: 10 INJECTION, SOLUTION INTRAVENOUS at 11:38

## 2023-04-05 RX ADMIN — MAGNESIUM SULFATE IN DEXTROSE SCH MLS/HR: 10 INJECTION, SOLUTION INTRAVENOUS at 09:14

## 2023-04-05 RX ADMIN — MAGNESIUM SULFATE IN DEXTROSE SCH MLS/HR: 10 INJECTION, SOLUTION INTRAVENOUS at 06:54

## 2023-04-05 RX ADMIN — MAGNESIUM SULFATE IN DEXTROSE SCH MLS/HR: 10 INJECTION, SOLUTION INTRAVENOUS at 07:47

## 2023-04-05 RX ADMIN — SODIUM CHLORIDE SCH MLS/HR: 900 INJECTION INTRAVENOUS at 04:05

## 2023-04-05 RX ADMIN — ENOXAPARIN SODIUM SCH MG: 150 INJECTION SUBCUTANEOUS at 14:13

## 2023-04-05 RX ADMIN — MAGNESIUM SULFATE IN DEXTROSE SCH MLS/HR: 10 INJECTION, SOLUTION INTRAVENOUS at 10:43

## 2023-04-05 RX ADMIN — SODIUM CHLORIDE SCH MLS/HR: 900 INJECTION INTRAVENOUS at 13:08

## 2023-04-05 RX ADMIN — ACETAMINOPHEN PRN MG: 325 TABLET ORAL at 23:21

## 2023-04-05 RX ADMIN — SODIUM CHLORIDE SCH MLS/HR: 900 INJECTION, SOLUTION INTRAVENOUS at 09:14

## 2023-04-05 RX ADMIN — SODIUM CHLORIDE SCH MLS/HR: 900 INJECTION, SOLUTION INTRAVENOUS at 14:13

## 2023-04-05 RX ADMIN — MICONAZOLE NITRATE SCH GM: 20 POWDER TOPICAL at 13:08

## 2023-04-05 RX ADMIN — MICONAZOLE NITRATE SCH GM: 20 POWDER TOPICAL at 20:53

## 2023-04-05 RX ADMIN — Medication SCH MLS/HR: at 06:54

## 2023-04-05 NOTE — HISTORY & PHYSICAL-HOSPITALIST
MALINDA TRUONG 23 1336:


History of Present Illness


HPI/Chief Complaint


This patient is an 78 year old female with history of Gout, HTN, HLD, Osteoarth

ritis, Obesity, and possible hypothyroidism who presented to [place] ED 

yesterday for severe left leg pain that was hindering ability to walk at home. 

She is normally independent with a walker and was not able to walk due to the 

pain. Upon arriving at the ED the patient was afebrile, tachycardic, 

normotensive, and tolerating RA. She was found to have WBC of 19, CRP 16.7, CR 

2.41, and LA 1.62. X-rays of left femur & Tib/fib were negative. venous 

ultrasound revelaed moderate burden non occlusive DVT of LLE venous systm. She 

was also found to have a stage 2 decubitis ulcer per ED notes and while waiting 

to be admitted to [facility], she developed hypotension with systolic 70-85. 

She was started on Vanc and Zosyn for broad spectrum coverage, therapuetic 

lovenox for DVT, and admitted to the ICU where she required levophed drip due to

severl BP < MAP 65. When seen this morning patient was still on levophed at rate

of 0.01 and was normotensive. She is alert to self and place but not year. She 

reports bilateral knee pain and denies LH, CP, SOB, Cough, abd pain, N/V/D.


Source:  patient, EMS notes reviewed


Exam Limitations:  other (Patient is a poor historian with some confusion)


Date Seen


23


Time Seen by a Provider:  08:30


Attending Physician


Flor Arellano Aprn


PCP


Admitting Physician:


Mirna Upton DO 








Attending Physician:


Mirna Upton DO


Referring Physician





Date of Admission


2023 at 16:08





Home Medications & Allergies


Home Medications


Reviewed patient Home Medication Reconciliation performed by pharmacy medication

reconciliations technician and/or nursing.


Patients Allergies have been reviewed.





Allergies





Allergies


Coded Allergies


  No Known Drug Allergies (Aumdiyddop57/2/22)








Past Medical-Social-Family Hx


Patient Social History


Tobacco Use?:  No


Substance use?:  No


Alcohol Use?:  No


Pt feels they are or have been:  No





Immunizations Up To Date


Tetanus Booster (TDap):  Unknown





Current Status


Advance Directives:  Unable to obtain


Communicates:  Verbally


Primary Language:  English


Preferred Spoken Language:  English


Is interpretation needed?:  No


Sensory deficits:  Vision impairment


Implanted or Applied Medical D:  None





Past Medical History


Surgeries:  Gallbladder (Per patient)


High Cholesterol, Hypertension


Arthritis





Family Medical History


No Pertinent Family Hx





Review of Systems


Constitutional:  No chills, No fever


EENTM:  No hearing loss, No vision loss


Respiratory:  No dyspnea on exertion, No short of breath


Gastrointestinal:  No abdominal pain, No nausea, No vomiting


Genitourinary:  no symptoms reported


Musculoskeletal:  other (Bilateral knee pain reported)


Skin:  change in color (some redness of LLE)


Psychiatric/Neurological:  No Symptoms Reported





Physical Exam


Physical Exam


Vital Signs





Vital Signs - First Documented








 23





 12:18 19:19


 


Temp 37.6 


 


Pulse 113 


 


Resp 20 


 


B/P (MAP) 112/73 (86) 


 


Pulse Ox 95 


 


O2 Delivery Room Air 


 


FiO2  21





Capillary Refill : Less Than 3 Seconds


Height, Weight, BMI


Height: '"


Weight: lbs. oz. kg; 42.14 BMI


Method:


General Appearance:  No Apparent Distress, WD/WN, Obese


Eyes:  Bilateral Eye PERRL, Bilateral Eye EOMI


HEENT:  PERRL/EOMI, Pharynx Normal, Moist Mucous Membranes


Neck:  Normal Inspection, Non Tender, Supple


Respiratory:  Lungs Clear, Normal Breath Sounds, No Accessory Muscle Use, No 

Respiratory Distress


Cardiovascular:  Regular Rate, Rhythm, No Murmur, Other (2+ pitting edema in 

left leg. unable to palpate pedal puls. Right leg no edema and 2+ pedal pulse)


Gastrointestinal:  Normal Bowel Sounds, Non Tender, Soft


Extremity:  Other (Minimal erthema of left LLE. Markedly tender to palpation 

around entirety of left knee, particularly in the joint space. No calf 

tenderness and no tenderness of RLE. )


Neurologic/Psychiatric:  Alert, No Motor/Sensory Deficits, Other (Patient is 

oriented to self and place but is not oriented to year and is a poor historian. 

Unclear if this is baseline mental status for patient.)


Lymphatic:  No Adenopathy





Results


Results/Procedures


Labs


Laboratory Tests


23 12:55








23 05:08








23 06:06








Patient resulted labs reviewed.


Imaging:  Reviewed Imaging Report


Imaging


NAME:   SPIKE GARCIA S


[facility] REC#:   [id number]


ACCOUNT#:   [id number]


PT STATUS:   REG ER


:   1945


PHYSICIAN:   TAZ GRANDA MD


ADMIT DATE:   23/ER FS


                                  ***Signed***


Date of Exam:23





US VENOUS LOWER EXT LT








EXAMINATION: US Lower Extremity Venous Duplex Left.





TECHNIQUE: Multiple real-time grayscale images were obtained over


the left lower extremity in various projections. Additional


spectral analysis and color Doppler duplex images were also


obtained. 





HISTORY:  Left lower extremity pain and edema.





COMPARISON: None available.





FINDINGS: 





There is a nonocclusive thrombus within the left common femoral


vein which demonstrates partial compressibility. There is normal


color Doppler filling and compressibility within the profunda


femoris vein and proximal portion of the left superficial femoral


vein. Nonocclusive thrombus is visualized in the mid and distal


left superficial femoral vein and left popliteal vein. There is


occlusion of the left peroneal trunk.





IMPRESSION:





1. Moderate burden of nonocclusive deep vein thrombus in the left


lower extremity venous system.





Dictated by: 





  Dictated on workstation # [id number]








Dict:   23 1335


Trans:   23 1344


[facility] [id number]





Interpreted by:     SOCORRO FORD DO


Electronically signed by: SOCORRO FORD DO 23 1344








NAME:   SPIKE GARCIA


[facility] REC#:   [id number]


ACCOUNT#:   [id number]


PT STATUS:   REG ER


:   1945


PHYSICIAN:   TAZ GRANDA MD


ADMIT DATE:   23/ER FS


                                  ***Signed***


Date of Exam:23





FEMUR 2 VIEW LEFT








CLINICAL HISTORY: Left leg pain and swelling. Injury.





COMPARISON: None.





TECHNIQUE: 3 views of the left femur.





FINDINGS: 


There is no acute fracture or dislocation of the left femur. 


Alignment is anatomic.  The imaged joint spaces are preserved. No


suspicious focal osseous lesion. There is generalized edema in


the left lower extremity.





IMPRESSION: 


1. No acute fracture or dislocation in the left femur. Please


note the left knee is not well characterized on this exam. If


there is localized left knee pain consider dedicated radiographs


of the left knee to further evaluate.


2. Edema in the visualized left lower extremity.





Dictated by: 





  Dictated on workstation # [id number]








Dict:   23 1301


Trans:   23 1306


[facility] [phone]





Interpreted by:     SOCORRO FORD DO


Electronically signed by: SOCORRO FORD DO 23 1306








NAME:   SPIKE GARCIA


[facility] REC#:   [id number]


ACCOUNT#:   [id number]


PT STATUS:   REG ER


:   1945


PHYSICIAN:   TAZ GRANDA MD


ADMIT DATE:   23/ER FS


                                  ***Signed***


Date of Exam:23





TIBIA FIBULA 2 VIEW LEFT








CLINICAL HISTORY: Left leg pain.





COMPARISON: None.





TECHNIQUE: 2 views of the left tibia and fibula.





FINDINGS: 


There is no acute fracture or dislocation of the left tibia and


fibula.  Alignment is anatomic.  The imaged joint spaces are


preserved. No focal osseous lesions are seen. There is


generalized soft tissue edema in the left lower extremity.





IMPRESSION: 


1. No acute fracture or dislocation in the left tibia and fibula.


2. Soft tissue edema in the left lower extremity.





Dictated by: 





  Dictated on workstation # [id number]








Dict:   23 1259


Trans:   23 1303


[facility] [phone]





Interpreted by:     SOCORRO FORD DO


Electronically signed by: SOCORRO FORD DO 23 1303





Assessment/Plan


Admission Diagnosis


Sepsis w/ shock





Assessment and Plan


Sepsis w/ shock


   -Source: thought to be from LLE celleulitis vs decubitis ulcer. UTI ruled 

out.


   -Vanc/Zosyn for broad coverage and IVF. Levophed as needed for MAP <65 with 

goal to titrate off


   -Consider CXR although vitals and exam are unremarkable.


   -consult wound care for decubitis ulcer





LLE DVT


   -Therapeutic lovenox renally dosed





ADRIANA


   -Cr improved this morning to 2.11 from 2.41 yesterday. Unclear what patient's

baseline Cr due to no prior visits





Gout


   -Patient had significant pitting edema left leg but minimal cellulitis on 

time of exam. She was markedly tender to palpation of left knee. May consider 

giving steroids to see if it helps improve pain. since her pain seems localized 

to the left knee. Was given 15mg Ketorolac IV yesterday. Will avoid more at this

time due to Cr





Hypomagnesemia


   - Mg2+ of 1.1 this morning. Will replace although Cr > 2.0 per E-ICU recommen

dations





Clinical Quality Measures


DVT/VTE Risk/Contraindication:


Contraindications-Mechi:  Other *list below*


Other:  


dvt





MIRNA UPTON DO 23 0608:


Past Medical-Social-Family Hx


Patient Social History


Marrital Status:  single


Employed/Student:  retired


Smoking Status:  Unknown if Ever Smoked





Past Medical History


Dementia


Renal Failure





Review of Systems


Constitutional:  see HPI





Physical Exam


Physical Exam


General Appearance:  No Apparent Distress, Chronically ill


Respiratory:  Lungs Clear, Normal Breath Sounds


Neurologic/Psychiatric:  Alert, Disoriented





Assessment/Plan


Admission Diagnosis


Hypotension without evidence of sepsis


Right leg DVT


Dementia


Severe debility


ADRIANA


CKD


Admission Status:  Inpatient Order (span 2 midnights)


Reason for Inpatient Admission:  


hypotension with pressors required in ICU





Supervisory-Addendum Brief


Verification & Attestation


Participated in pt care:  history, MDM, physical


Personally performed:  exam, history, MDM, supervision of care


Care discussed with:  Medical Student


Procedures:  n/a


Results interpretation:  Verified all documentation


Verification and Attestation of Medical Student E/M Service





A medical student performed and documented this service in my presence. I 

reviewed and verified all information documented by the medical student and made

modifications to such information, when appropriate. I personally performed the 

physical exam and medical decision making. 





 Mirna Upton, 2023,06:08











MALINDA TRUONG                 2023 13:36


MIRNA UPTON DO                 2023 06:08

## 2023-04-05 NOTE — TELE-ICU PROGRESS NOTE
Subjective


Date Seen by a Provider:  2023


Subjective/Events-last exam


This virtual visit was conducted using real time audio/video.


Thank you for asking us to see this patient for critical care services due to 

sepsis, cellulitis and non-oclusive DVT LLE. Also gluteal cleft decub.








PE: Resting comfortably, obese. VSS.  O2 sat 100% on RA





HEENT: No obvious masses, adenopathy or JVD.


              Chest: clear to auscultation.


              CV: RRR S1 S2 No murmur or added sounds.


              Abd: Non-tender. Bowel sounds Y.


              : Unremarkable. FoleyY .


              CNS/psychiatric: Grossly intact. No obvious focal findings.


              Extremities: L ankle edema. Capillary refill < 3 seconds.


              Skin: unremarkable.





Results: Elevated WCC 16.2, decreasing, BUN 49, Creat 2.11.   Decreased Mag 1.1.

 AB.41/39/134 on RA.    


Available chart/ vitals / labs / images reviewed.


Video assessment done using teleICU camera, rest of exam as per RN.





A/P:  


          Critical Care: critically ill patient. Cont. abx, Lovenox. Levophed 

held. Replace Mag.





Discussed with HUI Charles. Asked RN to reach out to eICU if any questions or 

concerns later. 


Time spent with patient/coordination of care with other health professionals 

(mins):





Sepsis Event


Evaluation


Height, Weight, BMI


Height: '"


Weight: lbs. oz. kg; 42.14 BMI


Method:





Focused Exam


Lactate Level


23 14:10: Lactic Acid Level 1.62





Exam


Exam


Patient acknowledged, consented, and participated in this virtual visit which 

was conducted using real time audio/video


Vital Signs








  Date Time  Temp Pulse Resp B/P (MAP) Pulse Ox O2 Delivery O2 Flow Rate FiO2


 


23 08:00  87 19 120/76 (91) 93 Room Air  


 


23 07:00 36.5       


 


23 07:00  87 25 120/61 (80) 97 Room Air  


 


23 06:50  87      


 


23 06:30  75 14 123/54 (77) 98 Room Air  


 


23 06:15  74 17 106/58 (82) 98 Room Air  


 


23 05:00  75 14 103/48 (67) 97 Room Air  


 


23 04:09 36.7       


 


23 04:00  78 12 108/47 (71) 95 Room Air  


 


23 04:00     98 Room Air  


 


23 03:00  92 16 123/59 (66) 99 Room Air  


 


23 02:00  83 18 114/64 (89) 100 Room Air  


 


23 01:00  89      


 


23 01:00  90 20 116/55 (72) 100 Room Air  


 


23 00:06 36.8       


 


23 00:00  75 18 129/60 (83) 97 Room Air  


 


23 23:59     98 Room Air  


 


23 23:00  85 15 109/51 (57) 100 Room Air  


 


23 22:05  92 21 119/45 (72) 100 Room Air  


 


23 22:03  80 18 123/108 (114) 99 Room Air  


 


23 22:00  78 18 105/36 (48) 99 Room Air  


 


23 21:00  80 19 103/44 (66) 100 Room Air  


 


23 20:00  87 20 97/46 (62) 100 Room Air  


 


23 20:00     98 Room Air  


 


23 19:46 36.5       


 


23 19:45  89 17 96/47 (59) 99 Room Air  


 


23 19:30  90 16 105/50 (67) 99 Room Air  


 


23 19:27  93 20 92/72 (75) 100 Room Air  


 


23 19:19 37.0 96   95   21


 


23 19:15  94 20 94/47 (53) 9 Room Air  


 


23 19:00  92      


 


23 19:00  92 19 110/44 (66) 99 Room Air  


 


23 18:00  95 10 139/69 (92) 98 Room Air  


 


23 17:30  96 22 122/63 (82) 95 Room Air  


 


23 17:15  91 13 107/72 (84) 97 Room Air  


 


23 17:00  90 20 77/61 (66) 98 Room Air  


 


23 16:45  91 15 112/65 (81) 98 Room Air  


 


23 16:43 37.0       


 


23 16:43    77/40    


 


23 16:34  91      


 


23 16:30  98 23 89/49 (62) 98 Room Air  


 


23 16:25     100 Room Air  


 


23 16:15  92  75/41 (52)  Room Air  


 


23 15:25  96 18 114/91 99 Room Air  


 


23 12:18 37.6 113 20 112/73 (86) 95 Room Air  














I & O 


 


 23





 07:00


 


Intake Total 3586 ml


 


Output Total 1000 ml


 


Balance 2586 ml








Height & Weight


Height: '"


Weight: lbs. oz. kg; 42.14 BMI


Method:


General Appearance:  No Apparent Distress


Respiratory:  Lungs Clear, Decreased Breath Sounds


Cardiovascular:  Regular Rate, Rhythm, Other


Capillary Refill:  Less Than 3 Seconds


Gastrointestinal:  normal bowel sounds, non tender, soft, no pulsatile mass





Results


Lab


Laboratory Tests


23 12:55








23 05:08








23 06:06











Assessment/Plan


Assessment/Plan


See free text.


Critical Care:  Critically Ill Patient











FELIPE SOLITARIO MD           2023 09:43

## 2023-04-05 NOTE — OCCUPATIONAL THERAPY EVAL
OT Evaluation-General/PLF


Medical Diagnosis


Admission Date


Apr 4, 2023 at 16:08


Medical Diagnosis:  sepsis/cellulitis


Onset Date:  Apr 4, 2023





Therapy Diagnosis


Therapy Diagnosis:  weakness/debility





Precautions


Precautions/Isolations:  Fall Prevention, Standard Precautions





Weight Bear Status


Weight Bearing Restriction:  Full Weight Bearing





Referral


Referral Reason:  Activity Tolerance, Self Care, Evaluation/Treatment





Medical History


Additional Medical History


Stage II decubi on B feet, and bottom, Gluteal fold yeast, hand tremors, obesity


Current History


Lives at home w/ family/assistance, low motivation at home, minimal mobility, 

primary setting in home is lift recliner. On admission PATIENT HAS STAGE II SKIN

BREAKDOWN RELATED TO MASD AND BODY HABITUS IN ALL BODY FOLDS. THERE IS SKIN 

DUSKINESS TO GLUTEAL CLEFT AND GLUTEAL FOLDS. THE SKIN IS PEELING AND FRIABLE, 

YEASTY ODOR NOTED DURING CLEANSING. PATIENT IS INCONTINENT OF BOWEL


Reviewed History:  Yes





Social History


Home:  Single Level


Current Living Status:  Other Family


Entry Into Home:  Ramp, Level Entry





ADL-Prior Level of Function


SCALE: Activities may be completed with or without assistive devices.





6-Indepedent-patient completes the activity by him/herself with no assistance 

from a helper.


5-Set-up or Clean-up Assistance-helper sets up or cleans up; patient completes 

activity. [place] assists only prior to or  


    following the activity.


4-Supervision or Touching Assistance-helper provides verbal cues and/or 

touching/steadying and/or contact guard assistance as patient completes 

activity. Assistance may be provided   


    throughout the activity or intermittently.


3-Partial/Moderate Assistance-helper does LESS THAN HALF the effort. [place] 

lifts, holds or supports trunk or limbs, but provides less than half the effort.


2-Substantial/Maximal Assistance-helper does MORE THAN HALF the effort. [place] 

lifts or holds trunk or limbs and provides more than half the effort.


9-Kqrcxiomf-wfrfdl does ALL the effort. Patient does none of the effort to 

complete the activity. Or, the assistance of 2 or more helpers is required for 

the patient to complete the  


    activity.


If activity was not attempted, code reason:


7-Patient Refused.


9-Not Applicable-not attempted and the patient did not perform the activity 

before the current illness, exacerbation or injury.


10-Not Attempted due to Environmental Limitations-(lack of equipment, weather 

restraints, etc.).


88-Not Attempted due to Medical Conditions or Safety Concerns.


Self Care:  Needed Some Help


Functional Cognition:  Needed Some Help


DME/Equipment:  Toilet/Riser


DME/Equipment Comments


WC, FWW, RTS, lift chair


Drive Self:  No





OT Current Status


Subjective


Agreeable to OT eval, family in room, patient hollers out "ouch" and "that 

hurts" w/o physical contact performed by OT,  OT calms patient to determine 

hollers are in anticipation of maybe ROM will hurt.





Mental Status/Objective


Patient Orientation:  Person, Place, Eyes Open (closes eyes when asked to perf

orm task, behavior resolves as evaluation progresses), Situation


Attachments:  IV, Oxygen, SCD's, Telemetry





Current


Upper Extremity ROM


limited by excessive soft tissue, reduced joint approximation, 90 shoulder 

flexion BUE


Upper Extremity Coordination


bilateral hand tremors. family report tremors are long standing but unknown 

etiology,  +3/5


Upper Extremity Sensation


inconsistent as every touch patient hollers "OUCH"


Upper Extremity Strength


-3/5, unable to hold own weight bed sidelying, unable to sidelying to push to 

sitting.





ADL-Treatment


Eating (QC):  5 (hand tremors, able to insert straw into styrofaom lid/cup and 

reach from bed tray table on left side of bed)


Oral Hygiene (QC):  4


Shower/Bathe Self (QC):  88 (wounds)


Upper Body Dressing (QC):  2


Lower Body Dressing (QC):  1


On/Off Footwear (QC):  1


Toileting Hygiene (QC):  1





Education


OT Patient Education:  Correct positioning, Disease process, Exercise program, 

Modified ADL techniques, Progress toward Goal/Update tx plan, Purpose of 

tx/functional activities, Reviewed precautions, Rehab process, Safety issues, 

Transfer techniques, Use of adapted equipment


Teaching Recipient:  Patient, Family


Teaching Methods:  Demonstration


Response to Teaching:  Verbalize Understanding, Reinforcement Needed





OT Long Term Goals


Long Term Goals


Eating (QC):  6


Oral Hygiene (QC):  6


Toileting Hygiene (QC):  4


Shower/Bathe Self (QC):  3


Upper Body Dressing (QC):  4


Lower Body Dressing (QC):  4


On/Off Footwear (QC):  4


1=Demonstrate adherence to instructed precautions during ADL tasks.


2=Patient will verbalize/demonstrate understanding of assistive 

devices/modifications for ADL.


3=Patient will improve strength/tolerance for activity to enable patient to 

perform ADL's.





OT Education/Plan


Problem List/Assessment


Assessment:  Decreased Activ Tolerance, Decreased UE Strength, Dependent 

Transfers, Edema, Impaired Bed Mobility, Impaired Coordination, Impaired Funct 

Balance, Impaired Self-Care Skills, Restricted Funct UE ROM





Discharge Recommendations


Plan/Recommendations:  Continue POC





Treatment Plan/Plan of Care


Treatment,Training & Education:  Yes


Patient would benefit from OT for education, treatment and training to promote 

independence in ADL's, mobility, safety and/or upper extremity function for 

ADL's.


Plan of Care:  ADL Retraining, Caregiver Training, Concurrent Therapy, 

Functional Mobility, Group Exercise/Act as Ind, UE Funct Exercise/Act, UE 

Neuromus Re-Ed/Coord


Treatment Duration:  Apr 15, 2023


Frequency:  3 times per week (3-5 times per week)


Estimated Hrs Per Day:  .25 hour per day


Agreement:  Yes


Rehab Potential:  Guarded


Remains in reclined position offloaded from right side of body, family in room, 

all needs met





Time


Start Time:  13:30


Stop Time:  13:43


DATE:  Apr 5, 2023


Total Time Billed (hr/min):  13


Billed Treatment Time


[facility] 13 min











DRINNEN,MICHELLE OT             Apr 5, 2023 16:22

## 2023-04-05 NOTE — DIAGNOSTIC IMAGING REPORT
INDICATION: Hypoxia



Frontal chest obtained at 1121 a.m.



There  is no prior study for comparison



The heart is mildly enlarged. There is some atelectatic change in

the left base. Lungs are otherwise clear. There is  no

pneumothorax or pleural fluid.



IMPRESSION: Mild left basilar atelectasis but no focal

infiltrate. Relatively poor inspiration.



Dictated by: 



  Dictated on workstation # [id number]

## 2023-04-06 VITALS — DIASTOLIC BLOOD PRESSURE: 51 MMHG | SYSTOLIC BLOOD PRESSURE: 101 MMHG

## 2023-04-06 VITALS — DIASTOLIC BLOOD PRESSURE: 44 MMHG | SYSTOLIC BLOOD PRESSURE: 85 MMHG

## 2023-04-06 VITALS — DIASTOLIC BLOOD PRESSURE: 39 MMHG | SYSTOLIC BLOOD PRESSURE: 70 MMHG

## 2023-04-06 VITALS — DIASTOLIC BLOOD PRESSURE: 59 MMHG | SYSTOLIC BLOOD PRESSURE: 90 MMHG

## 2023-04-06 VITALS — DIASTOLIC BLOOD PRESSURE: 42 MMHG | SYSTOLIC BLOOD PRESSURE: 91 MMHG

## 2023-04-06 VITALS — DIASTOLIC BLOOD PRESSURE: 78 MMHG | SYSTOLIC BLOOD PRESSURE: 136 MMHG

## 2023-04-06 VITALS — SYSTOLIC BLOOD PRESSURE: 97 MMHG | DIASTOLIC BLOOD PRESSURE: 57 MMHG

## 2023-04-06 VITALS — SYSTOLIC BLOOD PRESSURE: 87 MMHG | DIASTOLIC BLOOD PRESSURE: 43 MMHG

## 2023-04-06 VITALS — SYSTOLIC BLOOD PRESSURE: 94 MMHG | DIASTOLIC BLOOD PRESSURE: 54 MMHG

## 2023-04-06 VITALS — DIASTOLIC BLOOD PRESSURE: 53 MMHG | SYSTOLIC BLOOD PRESSURE: 104 MMHG

## 2023-04-06 VITALS — DIASTOLIC BLOOD PRESSURE: 57 MMHG | SYSTOLIC BLOOD PRESSURE: 109 MMHG

## 2023-04-06 VITALS — DIASTOLIC BLOOD PRESSURE: 55 MMHG | SYSTOLIC BLOOD PRESSURE: 103 MMHG

## 2023-04-06 VITALS — SYSTOLIC BLOOD PRESSURE: 78 MMHG | DIASTOLIC BLOOD PRESSURE: 44 MMHG

## 2023-04-06 LAB
ALBUMIN SERPL-MCNC: 2 GM/DL (ref 3.2–4.5)
ALP SERPL-CCNC: 216 U/L (ref 40–136)
ALT SERPL-CCNC: 18 U/L (ref 0–55)
BASOPHILS # BLD AUTO: 0 10^3/UL (ref 0–0.1)
BASOPHILS NFR BLD AUTO: 0 % (ref 0–10)
BILIRUB SERPL-MCNC: 0.6 MG/DL (ref 0.1–1)
BUN/CREAT SERPL: 22
CALCIUM SERPL-MCNC: 7.6 MG/DL (ref 8.5–10.1)
CHLORIDE SERPL-SCNC: 104 MMOL/L (ref 98–107)
CO2 SERPL-SCNC: 19 MMOL/L (ref 21–32)
CREAT SERPL-MCNC: 2.19 MG/DL (ref 0.6–1.3)
EOSINOPHIL # BLD AUTO: 0.3 10^3/UL (ref 0–0.3)
EOSINOPHIL NFR BLD AUTO: 2 % (ref 0–10)
GFR SERPLBLD BASED ON 1.73 SQ M-ARVRAT: 22 ML/MIN
GLUCOSE SERPL-MCNC: 136 MG/DL (ref 70–105)
HCT VFR BLD CALC: 27 % (ref 35–52)
HGB BLD-MCNC: 8.8 G/DL (ref 11.5–16)
LYMPHOCYTES # BLD AUTO: 0.9 10^3/UL (ref 1–4)
LYMPHOCYTES NFR BLD AUTO: 7 % (ref 12–44)
MAGNESIUM SERPL-MCNC: 2.7 MG/DL (ref 1.6–2.4)
MANUAL DIFFERENTIAL PERFORMED BLD QL: NO
MCH RBC QN AUTO: 29 PG (ref 25–34)
MCHC RBC AUTO-ENTMCNC: 33 G/DL (ref 32–36)
MCV RBC AUTO: 91 FL (ref 80–99)
MONOCYTES # BLD AUTO: 1.4 10^3/UL (ref 0–1)
MONOCYTES NFR BLD AUTO: 11 % (ref 0–12)
NEUTROPHILS # BLD AUTO: 10.5 10^3/UL (ref 1.8–7.8)
NEUTROPHILS NFR BLD AUTO: 79 % (ref 42–75)
PLATELET # BLD: 214 10^3/UL (ref 130–400)
PMV BLD AUTO: 10.1 FL (ref 9–12.2)
POTASSIUM SERPL-SCNC: 4.1 MMOL/L (ref 3.6–5)
PROT SERPL-MCNC: 4.4 GM/DL (ref 6.4–8.2)
SODIUM SERPL-SCNC: 133 MMOL/L (ref 135–145)
WBC # BLD AUTO: 13.3 10^3/UL (ref 4.3–11)

## 2023-04-06 RX ADMIN — HYDROCORTISONE SODIUM SUCCINATE SCH MG: 100 INJECTION, POWDER, FOR SOLUTION INTRAMUSCULAR; INTRAVENOUS at 11:29

## 2023-04-06 RX ADMIN — MICONAZOLE NITRATE SCH GM: 20 POWDER TOPICAL at 21:32

## 2023-04-06 RX ADMIN — SODIUM CHLORIDE SCH MLS/HR: 900 INJECTION, SOLUTION INTRAVENOUS at 15:08

## 2023-04-06 RX ADMIN — SODIUM CHLORIDE SCH MLS/HR: 900 INJECTION INTRAVENOUS at 19:41

## 2023-04-06 RX ADMIN — MIDODRINE HYDROCHLORIDE SCH MG: 10 TABLET ORAL at 06:21

## 2023-04-06 RX ADMIN — DOCUSATE SODIUM SCH MG: 100 CAPSULE ORAL at 08:07

## 2023-04-06 RX ADMIN — LEVOTHYROXINE SODIUM SCH MCG: 50 TABLET ORAL at 08:36

## 2023-04-06 RX ADMIN — HYDROCORTISONE SODIUM SUCCINATE SCH MG: 100 INJECTION, POWDER, FOR SOLUTION INTRAMUSCULAR; INTRAVENOUS at 15:00

## 2023-04-06 RX ADMIN — ENOXAPARIN SODIUM SCH MG: 150 INJECTION SUBCUTANEOUS at 15:00

## 2023-04-06 RX ADMIN — MICONAZOLE NITRATE SCH GM: 20 POWDER TOPICAL at 08:36

## 2023-04-06 RX ADMIN — MIDODRINE HYDROCHLORIDE SCH MG: 10 TABLET ORAL at 17:15

## 2023-04-06 RX ADMIN — DOCUSATE SODIUM SCH MG: 100 CAPSULE ORAL at 19:41

## 2023-04-06 RX ADMIN — ACETAMINOPHEN PRN MG: 325 TABLET ORAL at 10:29

## 2023-04-06 RX ADMIN — MIDODRINE HYDROCHLORIDE SCH MG: 10 TABLET ORAL at 13:18

## 2023-04-06 RX ADMIN — SODIUM CHLORIDE SCH MLS/HR: 900 INJECTION INTRAVENOUS at 12:56

## 2023-04-06 RX ADMIN — SODIUM CHLORIDE SCH MLS/HR: 900 INJECTION INTRAVENOUS at 05:39

## 2023-04-06 RX ADMIN — MIDODRINE HYDROCHLORIDE SCH MG: 10 TABLET ORAL at 00:03

## 2023-04-06 NOTE — PROGRESS NOTE - HOSPITALIST
MALINDA TRUONG 4/6/23 0956:


Subjective


HPI/CC On Admission


Date Seen by Provider:  Apr 6, 2023


Time Seen by Provider:  08:50


This patient is an 78 year old female with history of Gout, HTN, HLD, 

Osteoarthritis, Obesity, and possible hypothyroidism who presented to [place]

ED yesterday for severe left leg pain that was hindering ability to walk at 

home. She is normally independent with a walker and was not able to walk due to 

the pain. Upon arriving at the ED the patient was afebrile, tachycardic, 

normotensive, and tolerating RA. She was found to have WBC of 19, CRP 16.7, CR 

2.41, and LA 1.62. X-rays of left femur & Tib/fib were negative. venous 

ultrasound revelaed moderate burden non occlusive DVT of LLE venous systm. She 

was also found to have a stage 2 decubitis ulcer per ED notes and while waiting 

to be admitted to [facility], she developed hypotension with systolic 70-85. 

She was started on Vanc and Zosyn for broad spectrum coverage, therapuetic 

lovenox for DVT, and admitted to the ICU where she required levophed drip due to

severl BP < MAP 65. When seen this morning patient was still on levophed at rate

of 0.01 and was normotensive. She is alert to self and place but not year. She 

reports bilateral knee pain and denies LH, CP, SOB, Cough, abd pain, N/V/D.


Subjective/Events-last exam


Patient was awake lying in bed when seen this morning. She continues to be alert

to self and place but not time. She was moved from the ICU to the medical floor 

yesterday after it was thought her BP was meagan off the drip. Over night it 

appears that she was having rather marked hypotension with MAP frequently in the

50's and systolic in 70's. She has been receiving IVF's. She did get up to the 

commode with nursing this morning which reports patient is rather unstable on 

her feet at this time. She has a scherer catheter in place with clear yellow 

urine. She reports left knee pain is unchanged and rates it at 10/10 pain. She 

denies pain elsewhere and is not having any LH, Dizziness, CP, palpitations, 

SOB, or abd pain.





Review of Systems


General:  No Chills


HEENT:  No Visual Changes


Pulmonary:  No Dyspnea, No Cough


Cardiovascular:  No: Chest Pain, Palpitations


Gastrointestinal:  No: Nausea, Vomiting, Abdominal Pain


Genitourinary:  Other (scherer in palce)


Musculoskeletal:  leg pain (10/10 left knee pain reported)


Neurological:  No: Weakness, Numbness





Focused Exam


Lactate Level


4/4/23 14:10: Lactic Acid Level 1.62








Objective


Exam


Vital Signs





Vital Signs








  Date Time  Temp Pulse Resp B/P (MAP) Pulse Ox O2 Delivery O2 Flow Rate FiO2


 


4/6/23 10:58 36.9 80 18 104/53 (70) 97 Room Air  


 


4/4/23 19:19        21





Capillary Refill : Less Than 3 Seconds


General Appearance:  No Apparent Distress, WD/WN, Obese


HEENT:  Moist Mucous Membranes


Respiratory:  Chest Non Tender, Lungs Clear, Normal Breath Sounds, No Accessory 

Muscle Use, No Respiratory Distress


Cardiovascular:  Regular Rate, Rhythm, No Murmur, Normal Peripheral Pulses


Gastrointestinal:  Normal Bowel Sounds, Non Tender, Soft


Extremity:  Normal Capillary Refill, Other (1+ pitting edema left leg with 

diminished pedal pulse. markedly tender to palpation of knee and surrounding 

structures. Right leg no edema and 2+ pedal pulse.)


Neurologic/Psychiatric:  Alert, No Motor/Sensory Deficits, Other (unclear what 

patient's bas emental status is)


Skin:  Normal Color, Warm/Dry


Lymphatic:  No Adenopathy





Results/Procedures


Lab


Laboratory Tests


4/6/23 05:07








Patient resulted labs reviewed.


Imaging:  Reviewed Imaging Report





Assessment/Plan


Assessment and Plan


Assess & Plan/Chief Complaint


Sepsis w/ shock


   -Source: thought to be from LLE celleulitis vs decubitis ulcer. UTI & pna 

ruled out.


   -Vanc/Zosyn for broad coverage and IVF. consult wound care for decubitis 

ulcer.


   -(4/6) patient has been off levo since yesterday morning and is having some 

soft pressures (as low as MAP in the 50's). Will continue IVF's at this time but

may need to be transferred back to ICU and placed back on levophed drip if 

condition doesn't improve. Will start hydrocortisone 50mg Q8 for BP & possible 

acute gout flare





LLE DVT


   - Continue Therapeutic lovenox renally dosed. Swelling in LLE appears mildl 

improved





ADRIANA


   -Cr mostly unchanged from yesterday. Down from admission Cr of 2.41 Unclear 

what patient's baseline Cr due to no prior visits





Gout


   -Patient still having pitting edema, but no cellulitis appreciated on exam. 

She continues to be markedly tender to palpation of left knee. Initiating 

Hydrocortisone 50mg Q8 for potential acute gout flair and hypotension.





Hypomagnesemia


   - 4/4 Mg2+ of 1.1. Mg2+ replaced despite  Cr > 2.0 per E-ICU recommendations.

will monitor.





Hypothyroidism


   -Levothyroxine





DVT Proph: Lovenox


Diet: as tolerated





Clinical Quality Measures


DVT/VTE Risk/Contraindication:


Contraindications-Mechi:  Other *list below*


Other:  


dvt





MIRNA UPTON DO 4/7/23 0455:


Subjective


Review of Systems


General:  Fatigue, Malaise





Objective


Exam


General Appearance:  No Apparent Distress, WD/WN, Chronically ill, Obese


Respiratory:  Lungs Clear, Normal Breath Sounds


Cardiovascular:  Regular Rate, Rhythm


Neurologic/Psychiatric:  Alert, No Motor/Sensory Deficits





Supervisory-Addendum Brief


Verification & Attestation


Participated in pt care:  history, MDM, physical


Personally performed:  exam, history, MDM, supervision of care


Care discussed with:  Medical Student


Procedures:  n/a


Results interpretation:  Verified all documentation


Verification and Attestation of Medical Student E/M Service





A medical student performed and documented this service in my presence. I 

reviewed and verified all information documented by the medical student and made

modifications to such information, when appropriate. I personally performed the 

physical exam and medical decision making. 





 Mirna Upton, Apr 7, 2023,04:53











MALINDA TRUONG                 Apr 6, 2023 09:56


MIRNA UPTON DO                 Apr 7, 2023 04:55

## 2023-04-06 NOTE — OCC THERAPY PROGRESS NOTE
Therapy Progress Note


OT attempted to see patient for therapy is am, patient declined reporting that 

she had a BM episode and was not mentally prepared for OT and come back later, 

OT noted BP changes and consulted Ally, per Nurse request no OOB activity. OT 

returned later in morning for therapy and Patient cries when OT initiated chair 

position for feeding noon meal, OT recommends patient feed self and not visitor,

patient declined session and OT will return once more today if available to 

continue POC





NC DRINNEN,MICHELLE OT             Apr 6, 2023 11:59

## 2023-04-06 NOTE — PHYSICAL THERAPY EVALUATION
PT Evaluation-General


Medical Diagnosis


Admission Date


Apr 4, 2023 at 16:08


Medical Diagnosis:  sepsis/cellulitis


Onset Date:  Apr 4, 2023





Therapy Diagnosis


Therapy Diagnosis:  severe debility/weakness





Precautions


Precautions/Isolations:  Fall Prevention, Standard Precautions





Referral


Physician:  Josee


Reason for Referral:  Evaluation/Treatment





Medical History


Pertinent Medical History:  HTN


Additional Medical History


gout/morbid obesity


Current History


EMS secondary to right LE edema/redness and pain


Reviewed History:  Yes





Social History


Home:  Single Level


Current Living Status:  Other Family


Entry Into Home:  Ramp, Level Entry





Prior


Prior Level of Function


SCALE: Activities may be completed with or without assistive devices.





6-Indepedent-patient completes the activity by him/herself with no assistance 

from a helper.


5-Set-up or Clean-up Assistance-helper sets up or cleans up; patient completes 

activity. [place] assists only prior to or  


    following the activity.


4-Supervision or Touching Assistance-helper provides verbal cues and/or 

touching/steadying and/or contact guard assistance as patient completes 

activity. Assistance may be provided   


    throughout the activity or intermittently.


3-Partial/Moderate Assistance-helper does LESS THAN HALF the effort. [place] 

lifts, holds or supports trunk or limbs, but provides less than half the effort.


2-Substantial/Maximal Assistance-helper does MORE THAN HALF the effort. [place] 

lifts or holds trunk or limbs and provides more than half the effort.


2-[id number] does ALL the effort. Patient does none of the effort to 

complete the activity. Or, the assistance of 2 or more helpers is required for 

the patient to complete the  


    activity.


If activity was not attempted, code reason:


7-Patient Refused.


9-Not Applicable-not attempted and the patient did not perform the activity 

before the current illness, exacerbation or injury.


10-Not Attempted due to Environmental Limitations-(lack of equipment, weather 

restraints, etc.).


88-Not Attempted due to Medical Conditions or Safety Concerns.


Bed Mobility:  2


Transfers (B,C,W/C):  2


Gait:  2


Indoor Mobility (Ambulation):  Needed Some Help


Prior Devices Use:  Walker





PT Evaluation-Current


Subjective


Patient agrees to PT.  Noted incontinent BM





Objective


Patient Orientation:  Person, Time


Attachments:  Kwan Catheter, IV





ROM/Strength


ROM Lower Extremities


bilateral LE limited due to pain and swelling


Strength Lower Extremities


3-/5 grossly bilateral LE all planes





Integumentary/Posture


Integumentary


refer to nursing notes


Bowel Incontinence:  Yes


Bladder Incontinence:  Kwan Cath


Posture


trunk flexed posture





Neuromuscular


(Tone, Coordination, Reflexes)


diminished coordination





Sensory


Vision:  Wears Glasses


Hearing:  Impaired





Transfers


Roll Left to Right (QC):  1 (x 2)


Sit to Lying (QC):  1 (x 2)


Lying to Sitting/Side of Bed(Q:  1 (x 2)


Sit to Stand (QC):  1 (x 2)


Toilet Transfer (QC):  1 (x 2)





Gait


Does the Patient Walk?:  No and Walking Goal IS indicated


Walk 10 feet (QC):  88





Balance


Sitting Static:  Fair


Sitting Dynamic:  Fair


Standing Static:  Poor


 Standing Dynamic:  Poor





Assessment/Needs


Patient will benefit from skilled PT to address functional strength and mobility

to improve current LOF.  Patient is currently dependent assist of multiple 

staff.


Rehab Potential:  Guarded





PT Long Term Goals


Long Term Goals


PT Long Term Goals Time Frame:  Apr 22, 2023


Roll Left & Right (QC):  3


Sit to Lying (QC):  3


Lying-Sitting on Side/Bed(QC):  3


Sit to Stand (QC):  3


Chair/Bed-to-Chair Xfer(QC):  3


Toilet Transfer (QC):  3


Walk 10 feet (QC):  3





PT Plan


Problem List


Problem List:  Activity Tolerance, Functional Strength, Safety, Balance, Gait, 

Transfer, Bed Mobility





Treatment/Plan


Treatment Plan:  Continue Plan of Care


Treatment Plan:  Bed Mobility, Education, Functional Activity Sonia, Functional 

Strength, Gait, Safety, Therapeutic Exercise, Transfers


Treatment Duration:  Apr 22, 2023


Frequency:  6 times per week


Estimated Hrs Per Day:  .25 hour per day


Patient and/or Family Agrees t:  Yes





Time


Time In:  750


Time Out:  827


DATE:  Apr 6, 2023


Total Billed Treatment Time:  37


Total Billed Treatment


1 visit


EVModC 20 min


FA 17 min











ANSHUL FIGUEROA PT               Apr 6, 2023 10:41

## 2023-04-07 VITALS — DIASTOLIC BLOOD PRESSURE: 57 MMHG | SYSTOLIC BLOOD PRESSURE: 120 MMHG

## 2023-04-07 VITALS — SYSTOLIC BLOOD PRESSURE: 100 MMHG | DIASTOLIC BLOOD PRESSURE: 62 MMHG

## 2023-04-07 VITALS — SYSTOLIC BLOOD PRESSURE: 120 MMHG | DIASTOLIC BLOOD PRESSURE: 57 MMHG

## 2023-04-07 LAB
ALBUMIN SERPL-MCNC: 2.1 GM/DL (ref 3.2–4.5)
ALP SERPL-CCNC: 229 U/L (ref 40–136)
ALT SERPL-CCNC: 28 U/L (ref 0–55)
BASOPHILS # BLD AUTO: 0 10^3/UL (ref 0–0.1)
BASOPHILS NFR BLD AUTO: 0 % (ref 0–10)
BILIRUB SERPL-MCNC: 0.3 MG/DL (ref 0.1–1)
BUN/CREAT SERPL: 26
CALCIUM SERPL-MCNC: 8 MG/DL (ref 8.5–10.1)
CHLORIDE SERPL-SCNC: 106 MMOL/L (ref 98–107)
CO2 SERPL-SCNC: 19 MMOL/L (ref 21–32)
CREAT SERPL-MCNC: 2.28 MG/DL (ref 0.6–1.3)
EOSINOPHIL # BLD AUTO: 0 10^3/UL (ref 0–0.3)
EOSINOPHIL NFR BLD AUTO: 0 % (ref 0–10)
GFR SERPLBLD BASED ON 1.73 SQ M-ARVRAT: 21 ML/MIN
GLUCOSE SERPL-MCNC: 173 MG/DL (ref 70–105)
HCT VFR BLD CALC: 29 % (ref 35–52)
HGB BLD-MCNC: 9.4 G/DL (ref 11.5–16)
LYMPHOCYTES # BLD AUTO: 0.8 10^3/UL (ref 1–4)
LYMPHOCYTES NFR BLD AUTO: 6 % (ref 12–44)
MAGNESIUM SERPL-MCNC: 2.7 MG/DL (ref 1.6–2.4)
MANUAL DIFFERENTIAL PERFORMED BLD QL: YES
MCH RBC QN AUTO: 29 PG (ref 25–34)
MCHC RBC AUTO-ENTMCNC: 32 G/DL (ref 32–36)
MCV RBC AUTO: 90 FL (ref 80–99)
MONOCYTES # BLD AUTO: 0.8 10^3/UL (ref 0–1)
MONOCYTES NFR BLD AUTO: 6 % (ref 0–12)
MONOCYTES NFR BLD: 5 %
NEUTROPHILS # BLD AUTO: 11.4 10^3/UL (ref 1.8–7.8)
NEUTROPHILS NFR BLD AUTO: 87 % (ref 42–75)
NEUTS BAND NFR BLD MANUAL: 90 %
PLATELET # BLD: 264 10^3/UL (ref 130–400)
PMV BLD AUTO: 10.3 FL (ref 9–12.2)
POTASSIUM SERPL-SCNC: 4.1 MMOL/L (ref 3.6–5)
PROT SERPL-MCNC: 4.8 GM/DL (ref 6.4–8.2)
RBC MORPH BLD: NORMAL
SODIUM SERPL-SCNC: 134 MMOL/L (ref 135–145)
VARIANT LYMPHS NFR BLD MANUAL: 5 %
WBC # BLD AUTO: 13.1 10^3/UL (ref 4.3–11)

## 2023-04-07 RX ADMIN — ACETAMINOPHEN PRN MG: 325 TABLET ORAL at 08:23

## 2023-04-07 RX ADMIN — DOCUSATE SODIUM SCH MG: 100 CAPSULE ORAL at 08:24

## 2023-04-07 RX ADMIN — MIDODRINE HYDROCHLORIDE SCH MG: 10 TABLET ORAL at 06:22

## 2023-04-07 RX ADMIN — HYDROCORTISONE SODIUM SUCCINATE SCH MG: 100 INJECTION, POWDER, FOR SOLUTION INTRAMUSCULAR; INTRAVENOUS at 00:36

## 2023-04-07 RX ADMIN — MICONAZOLE NITRATE SCH GM: 20 POWDER TOPICAL at 08:25

## 2023-04-07 RX ADMIN — HYDROCORTISONE SODIUM SUCCINATE SCH MG: 100 INJECTION, POWDER, FOR SOLUTION INTRAMUSCULAR; INTRAVENOUS at 06:22

## 2023-04-07 RX ADMIN — LEVOTHYROXINE SODIUM SCH MCG: 50 TABLET ORAL at 08:24

## 2023-04-07 RX ADMIN — MIDODRINE HYDROCHLORIDE SCH MG: 10 TABLET ORAL at 08:24

## 2023-04-07 RX ADMIN — MIDODRINE HYDROCHLORIDE SCH MG: 10 TABLET ORAL at 00:36

## 2023-04-07 NOTE — PROGRESS NOTE
MALINDA TRUONG 4/7/23 1218:


Progress Note


Hospital Course





Patient is an 78 year old female who was admitted to [facility] from 4/4-4/7 

for sepsis 2nd to LLE cellulitis vs decubitis ulcer w/ shock, bacteremia, and 

non-occlusive DVT of LLE. During her stay she required a brief stay in the ICU 

for BP support with levophed. She was treated with IV abx with vanc & zosyn and 

her cellulitis and labwork improved. She received therapeutic lovenox for her 

non-occlusive DVT of LLE and had an improvement in swelling, although continued 

to have left knee pain. After a brief stay in the ICU she was transferred to the

medical floor where she continued to receive abx and lovenox. She did have low 

BP's while on the floor but eventually stabilized with IVF, midrodine, and 

hydrocortisone. By time of D/C patientt continued to have some LLE swelling and 

pain, but labwork had improved, vitals were stable, and she was determined safe 

to manage in the outpatient setting.





MIRNA UPTON DO 4/8/23 0651:


Supervisory-Addendum Brief


Verification & Attestation


Participated in pt care:  history, MDM, physical


Personally performed:  exam, history, MDM, supervision of care


Care discussed with:  Medical Student


Procedures:  n/a


Results interpretation:  Verified all documentation


Verification and Attestation of Medical Student E/M Service





A medical student performed and documented this service in my presence. I 

reviewed and verified all information documented by the medical student and made

modifications to such information, when appropriate. I personally performed the 

physical exam and medical decision making. 





 Mirna Upton Apr 8, 2023,06:51











MALINDA TRUONG                 Apr 7, 2023 12:18


MIRNA UPTON DO                 Apr 8, 2023 06:51

## 2023-04-07 NOTE — DISCHARGE SUMMARY
Discharge Summary


Hospital Course


Was the Problem List Reviewed?:  Yes


Problems/Dx:  


(1) Deep vein thrombosis (DVT) of left lower extremity


Status:  Acute


Qualifiers:  


   Qualified Codes:  I82.492 - Acute embolism and thrombosis of other specified 

deep vein of left lower extremity


(2) Acute kidney injury (nontraumatic)


Status:  Acute


(3) Decubitus ulcer of buttock


Status:  Acute


Qualifiers:  


   Qualified Codes:  L89.302 - Pressure ulcer of unspecified buttock, stage 2


(4) Sepsis


Status:  Acute


Qualifiers:  


   Qualified Codes:  A41.9 - Sepsis, unspecified organism; R65.21 - Severe 

sepsis with septic shock; N17.9 - Acute kidney failure, unspecified


(5) Dehydration


Status:  Acute


Hospital Course


Date of Admission: Apr 4, 2023 at 16:08 


Admission Diagnosis :  





Family Physician/Provider: Flor Arellano Aprn  





Date of Discharge: 4/7/23 


Discharge Diagnosis: [ ]








Hospital Course:


Hospital Course





Patient is an 78 year old female who was admitted to [facility] from 4/4-4/7 

for sepsis 2nd to LLE cellulitis vs decubitis ulcer w/ shock, bacteremia, and 

non-occlusive DVT of LLE. During her stay she required a brief stay in the ICU 

for BP support with levophed. She was treated with IV abx with vanc & zosyn and 

her cellulitis and labwork improved. She received therapeutic lovenox for her 

non-occlusive DVT of LLE and had an improvement in swelling, although continued 

to have left knee pain. After a brief stay in the ICU she was transferred to the

medical floor where she continued to receive abx and lovenox. She did have low 

BP's while on the floor but eventually stabilized with IVF, midrodine, and 

hydrocortisone. By time of D/C patientt continued to have some LLE swelling and 

pain, but labwork had improved, vitals were stable, and she was determined safe 

to manage in the outpatient setting.











MALINDA TRUONG














Labs and Pending Lab Test:


Laboratory Tests


4/6/23 10:52: Glucometer 132H


4/6/23 14:24: Vancomycin Level Trough 17.6


4/6/23 16:16: Glucometer 234H


4/6/23 21:02: Glucometer 255H


4/7/23 05:11: Glucometer 184H


4/7/23 05:22: 


White Blood Count 13.1H, Red Blood Count 3.22L, Hemoglobin 9.4L, Hematocrit 29L,

Mean Corpuscular Volume 90, Mean Corpuscular Hemoglobin 29, Mean Corpuscular 

Hemoglobin Concent 32, Red Cell Distribution Width 14.7H, Platelet Count 264, 

Mean Platelet Volume 10.3, Immature Granulocyte % (Auto) 1, Neutrophils (%) 

(Auto) 87H, Lymphocytes (%) (Auto) 6L, Monocytes (%) (Auto) 6, Eosinophils (%) 

(Auto) 0, Basophils (%) (Auto) 0, Neutrophils # (Auto) 11.4H, Lymphocytes # 

(Auto) 0.8L, Monocytes # (Auto) 0.8, Eosinophils # (Auto) 0.0, Basophils # 

(Auto) 0.0, Immature Granulocyte # (Auto) 0.1, Neutrophils % (Manual) [Pending],

 Sodium Level [Pending], Potassium Level [Pending], Chloride Level [Pending], 

Carbon Dioxide Level [Pending], Anion Gap [Pending], Blood Urea Nitrogen 

[Pending], Creatinine [Pending], BUN/Creatinine Ratio [Pending], Glucose Level 

[Pending], Calcium Level [Pending], Corrected Calcium [Pending], Magnesium Level

 [Pending], Total Bilirubin [Pending], Aspartate Amino Transf (AST/SGOT) 

[Pending], Alanine Aminotransferase (ALT/SGPT) [Pending], Alkaline Phosphatase 

[Pending], Total Protein [Pending], Albumin [Pending]





Microbiology


4/4/23 MRSA Screen - Final, Complete


         No growth


4/4/23 Blood Culture - Preliminary, Resulted


         No growth





Home Meds


Active


Hydrocortisone 5 Mg Tablet 5 Mg PO BID


Lotrimin AF (Miconazole Nitrate) 2 % Powder 0 Gm TOP BID


     tiwce daily in abd folds


Docusate Sodium 100 Mg Capsule 100 Mg PO BID


Xarelto Tablet (Rivaroxaban) 15 Mg Tablet 15 Mg PO BID@0700,1900


Midodrine HCl 10 Mg Tablet 10 Mg PO TID


Amox Tr-K Clv 875-125 mg Tab (Amoxicillin/Potassium Clav) 875 Mg-125 Mg Tablet 

875 Mg PO BID WITH MEALS


Levothyroxine Sodium 50 Mcg Tablet 50 Mcg PO DAILY


Atorvastatin Calcium 20 Mg Tablet 20 Mg PO HS


Reported


Meloxicam 15 Mg Tablet 15 Mg PO DAILY PRN


Furosemide 40 Mg Tablet 40 Mg PO DAILY


Lisinopril 5 Mg Tablet 5 Mg PO DAILY


Assessment/Pt Instructions


NH rounds


Discharge Planning:  <30 minutes discharge planning





Discharge Instructions


Discharge Diet:  No Restrictions


Activity as Tolerated:  Yes





Discharge Physical Examination


Vital Signs





Vital Signs








  Date Time  Temp Pulse Resp B/P (MAP) Pulse Ox O2 Delivery O2 Flow Rate FiO2


 


4/7/23 03:41 37.0 85 16 100/62 (75) 95 Room Air  


 


4/4/23 19:19        21








General Appearance:  No Apparent Distress, WD/WN, Chronically ill


Allergies:  


Coded Allergies:  


     No Known Drug Allergies (Unverified , 11/2/22)





Discharge Summary


Date of Admission


Apr 4, 2023 at 16:08


Date of Discharge





Discharge Date:  Apr 7, 2023


Admission Diagnosis


Hypotension without evidence of sepsis


Right leg DVT


Dementia


Severe debility


ADRIANA


CKD


Discharge Diagnosis








Clinical Quality Measures


DVT/VTE Risk/Contraindication:


Contraindications-Mechi:  Other *list below*


Other:  


dvt











MORALES UPTON DO                 Apr 7, 2023 05:58

## 2023-04-07 NOTE — DISCHARGE INST-SKILLED NURSING
Discharge Inst-Skilled NF


Reconcile Patient Problems


Problems Reviewed?:  Yes





Chief Complaint


This patient is an 78 year old female with history of Gout, HTN, HLD, 

Osteoarthritis, Obesity, and possible hypothyroidism who presented to [place]

ED yesterday for severe left leg pain that was hindering ability to walk at 

home. She is normally independent with a walker and was not able to walk due to 

the pain. Upon arriving at the ED the patient was afebrile, tachycardic, 

normotensive, and tolerating RA. She was found to have WBC of 19, CRP 16.7, CR 

2.41, and LA 1.62. X-rays of left femur & Tib/fib were negative. venous 

ultrasound revelaed moderate burden non occlusive DVT of LLE venous systm. She 

was also found to have a stage 2 decubitis ulcer per ED notes and while waiting 

to be admitted to [facility], she developed hypotension with systolic 70-85. 

She was started on Vanc and Zosyn for broad spectrum coverage, therapuetic 

lovenox for DVT, and admitted to the ICU where she required levophed drip due to

severl BP < MAP 65. When seen this morning patient was still on levophed at rate

of 0.01 and was normotensive. She is alert to self and place but not year. She 

reports bilateral knee pain and denies LH, CP, SOB, Cough, abd pain, N/V/D.





Patient Instructions


Patient Problems:  


DVT


Orthostasis


Debility





Consult/Follow Up/Orders


Follow Up Appt.:  


[facility] rounds


Skilled NF Admit to:  


Certification (SNF)


I certify that SNF services are required to be given on an inpatient basis 

because of the above named patient's need for skilled nursing care on a 

continuing basis for the conditions(s) for which he/she was receiving inpatient 

hospital services prior to his/her transfer to the SNF.


Skilled Nursing Facility Order:  Nursing Services, Occupational Ther-Evaluate & 

Treat, Physical Therapy-Evaluate & Treat, Speech Language-Evaluate & Treat


Oxygen Delivery Method:  Room Air


Discharge Diet:  No Restrictions


Resuscitation Status:  Full Code





New & Resume Previous Orders


New Medications:  


Hydrocortisone (Hydrocortisone) 5 Mg Tablet


5 MG PO BID, #14 TAB





Amoxicillin/Potassium Clav (Amox Tr-K Clv 875-125 mg Tab) 875 Mg-125 Mg Tablet


875 MG PO BID WITH MEALS, #6 TAB





Docusate Sodium (Docusate Sodium) 100 Mg Capsule


100 MG PO BID, #60 CAP





Miconazole Nitrate (Lotrimin AF) 2 % Powder


0 GM TOP BID, #1 EA


tiwce daily in abd folds


Midodrine HCl (Midodrine HCl) 10 Mg Tablet


10 MG PO TID, #90 TAB





Rivaroxaban (Xarelto Tablet) 15 Mg Tablet


15 MG PO BID@0700,1900, #72 TAB





 


Continued Medications:  


Atorvastatin Calcium (Atorvastatin Calcium) 20 Mg Tablet


20 MG PO HS, #30 TAB (This prescription has been renewed)





Levothyroxine Sodium (Levothyroxine Sodium) 50 Mcg Tablet


50 MCG PO DAILY, #30 TAB (This prescription has been renewed)





 


Discontinued Medications:  


Furosemide (Furosemide) 40 Mg Tablet


40 MG PO DAILY, TAB





Lisinopril (Lisinopril) 5 Mg Tablet


5 MG PO DAILY, TAB





Meloxicam (Meloxicam) 15 Mg Tablet


15 MG PO DAILY PRN for PAIN BREAKTROUGH, TAB








Mirna Tripp 


Apr 7, 2023 


05:57











MIRNA TRIPP DO                 Apr 7, 2023 05:58

## 2023-04-07 NOTE — OCCUPATIONAL THER DAILY NOTE
OT Current Status-Daily Note


Subjective


Reclined in bed, finished breakfast, agreeable to OT however declines sitting 

EOB or any OOB activity





ADL-Treatment


NO ADLS performed, patient refuses to sit up in bed, refuses to attempt EOB 

activity, NO changes in ADLS, OT recommends mechanical lift for transfers


Therapy Code Descriptions/Definitions 





Functional [place] Measure:


0=Not Assessed/NA        4=Minimal Assistance


1=Total Assistance        5=Supervision or Setup


2=Maximal Assistance  6=Modified [place]


3=Moderate Assistance 7=Complete IndependenceSCALE: Activities may be completed 

with or without assistive devices.





6-Indepedent-patient completes the activity by him/herself with no assistance 

from a helper.


5-Set-up or Clean-up Assistance-helper sets up or cleans up; patient completes 

activity. [place] assists only prior to or  


    following the activity.


4-Supervision or Touching Assistance-helper provides verbal cues and/or 

touching/steadying and/or contact guard assistance as patient completes 

activity. Assistance may be provided   


    throughout the activity or intermittently.


3-Partial/Moderate Assistance-helper does LESS THAN HALF the effort. [place] 

lifts, holds or supports trunk or limbs, but provides less than half the effort.


2-Substantial/Maximal Assistance-helper does MORE THAN HALF the effort. [place] 

lifts or holds trunk or limbs and provides more than half the effort.


2-Prsimcixe-hamtka does ALL the effort. Patient does none of the effort to 

complete the activity. Or, the assistance of 2 or more helpers is required for 

the patient to complete the  


    activity.


If activity was not attempted, code reason:


7-Patient Refused.


9-Not Applicable-not attempted and the patient did not perform the activity 

before the current illness, exacerbation or injury.


10-Not Attempted due to Environmental Limitations-(lack of equipment, weather 

restraints, etc.).


88-Not Attempted due to Medical Conditions or Safety Concerns.


dependent for ADLS, does feed self w/ tremor to hands, does wipe  mouth and 

brush hair from face with hand





Other Treatment


Red medium resistance therapy band in reclined bed position for 10x3 R/L UE 

shoulder extension, elbow ext and abd/adduction exercise to facilitate BUE 

strength for transfer and ADLs





Education


OT Patient Education:  Disease process, Exercise program, Modified ADL techni

ques, Progress toward Goal/Update tx plan, Purpose of tx/functional activities, 

Reviewed precautions, Rehab process, Safety issues, Transfer techniques, Use of 

adapted equipment


Teaching Recipient:  Patient


Teaching Methods:  Demonstration, Discussion


Response to Teaching:  Reinforcement Needed





OT Long Term Goals


Long Term Goals


Eating (QC):  6


Oral Hygiene (QC):  6


Toileting Hygiene (QC):  4


Shower/Bathe Self (QC):  3


Upper Body Dressing (QC):  4


Lower Body Dressing (QC):  4


On/Off Footwear (QC):  4


1=Demonstrate adherence to instructed precautions during ADL tasks.


2=Patient will verbalize/demonstrate understanding of assistive 

devices/modifications for ADL.


3=Patient will improve strength/tolerance for activity to enable patient to 

perform ADL's.





OT Education/Plan


Discharge Recommendations


Plan/Recommendations:  Continue POC


Therapy Discharge Recommendati:  Post Acute OT





Treatment Plan/Plan of Care


Patient would benefit from OT for education, treatment and training to promote 

independence in ADL's, mobility, safety and/or upper extremity function for 

ADL's.


Plan of Care:  ADL Retraining, Caregiver Training, Concurrent Therapy, 

Functional Mobility, Group Exercise/Act as Ind, UE Funct Exercise/Act, UE 

Neuromus Re-Ed/Coord


Treatment Duration:  Apr 15, 2023


Frequency:  3 times per week (3-5 times per week)


Estimated Hrs Per Day:  .25 hour per day


Agreement:  Yes


Rehab Potential:  Guarded


Patient learned of [facility] DC today an dis tearful, OT recommends mechaincal lift 

w/ sling for transfer to  for DC transport. Patient does not stand





Time


Start Time:  08:20


Stop Time:  08:43


DATE:  Apr 7, 2023


Total Time Billed (hr/min):  23


Billed Treatment Time


EX 2 23 min











DRINNEN,MICHELLE OT             Apr 7, 2023 08:42

## 2023-05-28 ENCOUNTER — HOSPITAL ENCOUNTER (OUTPATIENT)
Dept: HOSPITAL 75 - MLFS | Age: 78
End: 2023-05-28
Attending: NURSE PRACTITIONER
Payer: MEDICAID

## 2023-05-28 DIAGNOSIS — R03.0: Primary | ICD-10-CM

## 2023-05-28 LAB
ALBUMIN SERPL-MCNC: 2.4 GM/DL (ref 3.2–4.5)
ALP SERPL-CCNC: 265 U/L (ref 40–136)
ALT SERPL-CCNC: 14 U/L (ref 0–55)
BILIRUB SERPL-MCNC: 0.2 MG/DL (ref 0.1–1)
BUN/CREAT SERPL: 30
CALCIUM SERPL-MCNC: 8.5 MG/DL (ref 8.5–10.1)
CHLORIDE SERPL-SCNC: 102 MMOL/L (ref 98–107)
CO2 SERPL-SCNC: 26 MMOL/L (ref 21–32)
CREAT SERPL-MCNC: 1.05 MG/DL (ref 0.6–1.3)
GFR SERPLBLD BASED ON 1.73 SQ M-ARVRAT: 54 ML/MIN
GLUCOSE SERPL-MCNC: 99 MG/DL (ref 70–105)
POTASSIUM SERPL-SCNC: 5 MMOL/L (ref 3.6–5)
PROT SERPL-MCNC: 5.6 GM/DL (ref 6.4–8.2)
SODIUM SERPL-SCNC: 137 MMOL/L (ref 135–145)

## 2023-05-28 PROCEDURE — 80053 COMPREHEN METABOLIC PANEL: CPT

## 2023-07-17 ENCOUNTER — HOSPITAL ENCOUNTER (OUTPATIENT)
Dept: HOSPITAL 75 - MLFS | Age: 78
End: 2023-07-17
Attending: FAMILY MEDICINE
Payer: MEDICAID

## 2023-07-17 DIAGNOSIS — R19.7: Primary | ICD-10-CM

## 2023-07-17 PROCEDURE — 87046 STOOL CULTR AEROBIC BACT EA: CPT

## 2023-07-17 PROCEDURE — 87015 SPECIMEN INFECT AGNT CONCNTJ: CPT

## 2023-07-17 PROCEDURE — 87899 AGENT NOS ASSAY W/OPTIC: CPT

## 2023-07-17 PROCEDURE — 87045 FECES CULTURE AEROBIC BACT: CPT

## 2023-07-27 ENCOUNTER — HOSPITAL ENCOUNTER (EMERGENCY)
Dept: HOSPITAL 75 - ER FS | Age: 78
Discharge: ANOTHER HOSPITAL | End: 2023-07-27
Payer: MEDICAID

## 2023-07-27 VITALS — SYSTOLIC BLOOD PRESSURE: 136 MMHG | DIASTOLIC BLOOD PRESSURE: 78 MMHG

## 2023-07-27 DIAGNOSIS — R53.1: ICD-10-CM

## 2023-07-27 DIAGNOSIS — J96.90: ICD-10-CM

## 2023-07-27 DIAGNOSIS — I45.10: ICD-10-CM

## 2023-07-27 DIAGNOSIS — Z79.01: ICD-10-CM

## 2023-07-27 DIAGNOSIS — E66.9: ICD-10-CM

## 2023-07-27 DIAGNOSIS — R25.8: ICD-10-CM

## 2023-07-27 DIAGNOSIS — N39.0: Primary | ICD-10-CM

## 2023-07-27 LAB
ALBUMIN SERPL-MCNC: 2.7 GM/DL (ref 3.2–4.5)
ALP SERPL-CCNC: 578 U/L (ref 40–136)
ALT SERPL-CCNC: 49 U/L (ref 0–55)
APTT BLD: 50 SEC (ref 24–35)
APTT PPP: (no result) S
ARTERIAL PATENCY WRIST A: (no result)
BACTERIA #/AREA URNS HPF: (no result) /HPF
BASE EXCESS STD BLDA CALC-SCNC: 0.3 MMOL/L (ref -2.5–2.5)
BASOPHILS # BLD AUTO: 0 10^3/UL (ref 0–0.1)
BASOPHILS NFR BLD AUTO: 1 % (ref 0–10)
BDY SITE: (no result)
BILIRUB SERPL-MCNC: 0.6 MG/DL (ref 0.1–1)
BILIRUB UR QL STRIP: NEGATIVE
BODY TEMPERATURE: (no result)
BUN/CREAT SERPL: 10
CALCIUM SERPL-MCNC: 8 MG/DL (ref 8.5–10.1)
CHLORIDE SERPL-SCNC: 102 MMOL/L (ref 98–107)
CO2 BLDA CALC-SCNC: 25.8 MMOL/L (ref 21–31)
CO2 SERPL-SCNC: 26 MMOL/L (ref 21–32)
CREAT SERPL-MCNC: 0.86 MG/DL (ref 0.6–1.3)
EOSINOPHIL # BLD AUTO: 0 10^3/UL (ref 0–0.3)
EOSINOPHIL NFR BLD AUTO: 0 % (ref 0–10)
FIBRINOGEN PPP-MCNC: (no result) MG/DL
GFR SERPLBLD BASED ON 1.73 SQ M-ARVRAT: 68 ML/MIN
GLUCOSE SERPL-MCNC: 101 MG/DL (ref 70–105)
GLUCOSE UR STRIP-MCNC: NEGATIVE MG/DL
HCT VFR BLD CALC: 40 % (ref 35–52)
HGB BLD-MCNC: 12.5 G/DL (ref 11.5–16)
INHALED O2 FLOW RATE: (no result) L/MIN
INR PPP: 1.6 (ref 0.8–1.4)
KETONES UR QL STRIP: NEGATIVE
LEUKOCYTE ESTERASE UR QL STRIP: (no result)
LYMPHOCYTES # BLD AUTO: 1.6 X 10^3 (ref 1–4)
LYMPHOCYTES NFR BLD AUTO: 33 % (ref 12–44)
MANUAL DIFFERENTIAL PERFORMED BLD QL: NO
MCH RBC QN AUTO: 26 PG (ref 25–34)
MCHC RBC AUTO-ENTMCNC: 31 G/DL (ref 32–36)
MCV RBC AUTO: 82 FL (ref 80–99)
MONOCYTES # BLD AUTO: 0.7 X 10^3 (ref 0–1)
MONOCYTES NFR BLD AUTO: 13 % (ref 0–12)
NEUTROPHILS # BLD AUTO: 2.6 X 10^3 (ref 1.8–7.8)
NEUTROPHILS NFR BLD AUTO: 53 % (ref 42–75)
NITRITE UR QL STRIP: POSITIVE
PCO2 BLDA: 38 MMHG (ref 35–45)
PH BLDA: 7.42 [PH] (ref 7.37–7.43)
PH UR STRIP: 5.5 [PH] (ref 5–9)
PLATELET # BLD: 280 10^3/UL (ref 130–400)
PMV BLD AUTO: 10.3 FL (ref 9–12.2)
PO2 BLDA: 244 MMHG (ref 79–93)
POTASSIUM SERPL-SCNC: 4.2 MMOL/L (ref 3.6–5)
PROT SERPL-MCNC: 6.3 GM/DL (ref 6.4–8.2)
PROT UR QL STRIP: (no result)
PROTHROMBIN TIME: 19.3 SEC (ref 12.2–14.7)
RBC #/AREA URNS HPF: >100 /HPF
SAO2 % BLDA FROM PO2: 100 % (ref 94–100)
SODIUM SERPL-SCNC: 140 MMOL/L (ref 135–145)
SP GR UR STRIP: 1.01 (ref 1.02–1.02)
SQUAMOUS #/AREA URNS HPF: (no result) /HPF
VENTILATION MODE VENT: NO
WBC # BLD AUTO: 5 10^3/UL (ref 4.3–11)
WBC #/AREA URNS HPF: (no result) /HPF

## 2023-07-27 PROCEDURE — 99291 CRITICAL CARE FIRST HOUR: CPT

## 2023-07-27 PROCEDURE — 84484 ASSAY OF TROPONIN QUANT: CPT

## 2023-07-27 PROCEDURE — 31500 INSERT EMERGENCY AIRWAY: CPT

## 2023-07-27 PROCEDURE — 81000 URINALYSIS NONAUTO W/SCOPE: CPT

## 2023-07-27 PROCEDURE — 93005 ELECTROCARDIOGRAM TRACING: CPT

## 2023-07-27 PROCEDURE — 85025 COMPLETE CBC W/AUTO DIFF WBC: CPT

## 2023-07-27 PROCEDURE — 36415 COLL VENOUS BLD VENIPUNCTURE: CPT

## 2023-07-27 PROCEDURE — 80053 COMPREHEN METABOLIC PANEL: CPT

## 2023-07-27 PROCEDURE — 71045 X-RAY EXAM CHEST 1 VIEW: CPT

## 2023-07-27 PROCEDURE — 85610 PROTHROMBIN TIME: CPT

## 2023-07-27 PROCEDURE — 93041 RHYTHM ECG TRACING: CPT

## 2023-07-27 PROCEDURE — 70450 CT HEAD/BRAIN W/O DYE: CPT

## 2023-07-27 PROCEDURE — 82805 BLOOD GASES W/O2 SATURATION: CPT

## 2023-07-27 PROCEDURE — 87186 SC STD MICRODIL/AGAR DIL: CPT

## 2023-07-27 PROCEDURE — 70496 CT ANGIOGRAPHY HEAD: CPT

## 2023-07-27 PROCEDURE — 82947 ASSAY GLUCOSE BLOOD QUANT: CPT

## 2023-07-27 PROCEDURE — 51702 INSERT TEMP BLADDER CATH: CPT

## 2023-07-27 PROCEDURE — 87088 URINE BACTERIA CULTURE: CPT

## 2023-07-27 PROCEDURE — 85730 THROMBOPLASTIN TIME PARTIAL: CPT

## 2023-07-27 PROCEDURE — 87077 CULTURE AEROBIC IDENTIFY: CPT

## 2023-07-27 PROCEDURE — 70498 CT ANGIOGRAPHY NECK: CPT

## 2023-07-27 NOTE — DIAGNOSTIC IMAGING REPORT
INDICATION: Intubation.



Comparison is made with prior exam of 07/27/2023 at 2:59 PM. This

exam is done at 3:38 PM.



FINDINGS: There has been interval placement of an endotracheal

tube and nasogastric tube, both of which are in satisfactory

position. Heart size is normal. Mild venous congestion. No

pleural effusion or pneumothorax.  Mediastinum is unremarkable.



IMPRESSION: ET and NG tubes appear to be in satisfactory

position.



Mild central pulmonary venous congestion.



Dictated by: 



  Dictated on workstation # [id number]

## 2023-07-27 NOTE — DIAGNOSTIC IMAGING REPORT
PROCEDURE: CT head wo r/o stroke.



TECHNIQUE: Multiple contiguous axial images were obtained through

the brain without the use of intravenous contrast. Auto Exposure

Controls were utilized during the CT exam to meet ALARA standards

for radiation dose reduction. 



INDICATION: Altered mental status and visual disturbance



CT HEAD: CT images of the head were obtained. 



FINDINGS: Ventricles and sulci are within normal limits for size.

There is no intracranial hemorrhage identified. There is no

abnormal mass effect or shift of midline structures.



IMPRESSION: Unremarkable CT of the head.



Dictated by: 



  Dictated on workstation # [id number]

## 2023-07-27 NOTE — DIAGNOSTIC IMAGING REPORT
PROCEDURE: CT angiography of the head and CT angiography of the

neck with and without contrast.



TECHNIQUE: Contiguous noncontrast images were obtained from the

skull base through the vertex. After intravenous contrast

administration, helical CT angiography of the neck was performed.

Source data was reformatted into 3D MIP projections. Delayed post

contrast acquisition was also obtained. Auto Exposure Controls

were utilized during the CT exam to meet ALARA standards for

radiation dose reduction. 



INDICATION:  Altered mental status, delayed postcontrast enhanced

CT head showed no abnormal parenchymal or meningeal enhancement.

There is enhancement of the major dural venous sinuses. No

hydrocephalus or edema.



CT ANGIOGRAM NECK:  There is conventional branching of the great

vessels, the cervical vertebral arteries widely patent and

codominant. Bilateral common carotids patent. There are mild

calcified plaques at the bulbs and bifurcations without

significant stenosis. Remaining cervical internal carotids widely

patent.



CT ANGIOGRAM HEAD: The intradural vertebral arteries, the basilar

and the PCA segments unremarkable. The intracranial ICAs patent.

A1 segments, the ACOM, A2 segments unremarkable. The bilateral

middle cerebral arterial segments as well as their primary

branches are patent. No large vessel occlusion, thrombus,

aneurysm or vascular malformation. No hemodynamically significant

stenosis. The major dural venous sinuses are patent. 



IMPRESSION: Mild cervical atherosclerotic changes without

stenosis. No thrombus, large vessel occlusion, aneurysm,

dissection or other acute cervical or intracranial arterial

abnormalities identified.



Dictated by: 



  Dictated on workstation # [id number]

## 2023-07-27 NOTE — DIAGNOSTIC IMAGING REPORT
INDICATION: Altered mental status 



FINDINGS: Lungs are clear. No failure, effusion or pneumothorax. 



IMPRESSION: Unremarkable frontal chest.



Dictated by: 



  Dictated on workstation # [id number]

## 2023-07-27 NOTE — ED NEUROLOGICAL PROBLEM
General


Chief Complaint:  Neuro-Stroke Like Symptoms


Stated Complaint:  STROKE-LIKE SYMPTOMS


Source:  EMS, RN notes reviewed


Exam Limitations:  clinical condition





History of Present Illness


Date Seen by Provider:  Jul 27, 2023


Time Seen by Provider:  14:00


Initial Comments


77 yo F with h/o DVT on xarelto and recent history of admission to the hospital 

for C. difficile and elevated liver enzymes presents to the emergency department

via EMS for altered mentation, left gaze deviation.  Nursing staff states that 1

nurse left the room and another came in in the span of 30 minutes.  Last known 

well time was about 130 this afternoon.  On the second nurses arrival the 

patient would not respond to verbal commands and had apparent left gaze 

deviation.  The patient on arrival he is moaning and does apparently have a 

predilection for left gaze.  She will respond to verbal commands but is alert.  

Blood sugar for EMS in route was in the 90s.





All other systems reviewed and negative except documented per HPI.





Voice recognition software was used to help create this chart





Allergies and Home Medications


Allergies


Coded Allergies:  


     No Known Drug Allergies (Unverified , 11/2/22)





Patient Home Medication List


Home Medication List Reviewed:  Yes


Acetaminophen (Tylenol) 325 Mg Tablet, 650 MG PO Q6H PRN for PAIN-MILD (1-4) OR 

TEMPATURE, (Reported)


   Entered as Reported by: JONH JEFFREY on 7/19/23 1100


Colchicine (Colchicine) 0.6 Mg Tablet, 0.6 MG PO BID, (Reported)


   Entered as Reported by: JONH JEFFREY on 7/19/23 1100


Docusate Sodium (Docusate Sodium) 100 Mg Capsule, 100 MG PO BID, (Reported)


   Entered as Reported by: JONH JEFFREY on 7/19/23 1100


Furosemide (Furosemide) 20 Mg Tablet, 20 MG PO DAILY, (Reported)


   Entered as Reported by: JONH JEFFREY on 7/19/23 1100


Levothyroxine Sodium (Levothyroxine Sodium) 50 Mcg Tablet, 50 MCG PO DAILY, 

(Reported)


   Entered as Reported by: JONH JEFFREY on 7/19/23 1100


Loperamide HCl (Imodium A-D) 2 Mg Capsule, 2-4 MG PO UD PRN for DIARRHEA, 

(Reported)


   Entered as Reported by: JONH JEFFREY on 7/19/23 1100


Melatonin (Melatonin) 5 Mg Tablet, 5 MG PO HS, (Reported)


   Entered as Reported by: JONH JEFFREY on 7/19/23 1100


Miconazole Nitrate (Miconazole Nitrate) 2 % Powder, 1 APPLIC TP UD PRN for RASH,

(Reported)


   Entered as Reported by: JONH JEFFREY on 7/19/23 1100


Midodrine HCl (Midodrine HCl) 10 Mg Tablet, 10 MG PO TID, (Reported)


   Entered as Reported by: JONH JEFFREY on 7/19/23 1100


Nystatin (Nystop) 100,000 Unit/Gram Powder, 1 APPLIC TP BID, (Reported)


   Entered as Reported by: JONH JEFFREY on 7/19/23 1100


Rivaroxaban (Xarelto) 15 Mg Tablet, 15 MG PO DAILY, (Reported)


   Entered as Reported by: JONH JEFFREY on 7/19/23 1100


Tramadol HCl (Tramadol HCl) 50 Mg Tablet, 50 MG PO DAILY PRN for PAIN-MODERATE 

(5-7), (Reported)


   Entered as Reported by: JONH JEFFREY on 7/19/23 1100


Vancomycin HCl (Vancomycin HCl) 125 Mg Capsule, 125 MG PO QID


   Prescribed by: ENZO DELVALLE on 7/21/23 0726





Review of Systems


Review of Systems


Constitutional:  see HPI





Past Medical-Social-Family Hx


Patient Social History


Tobacco Use?:  No


Use of E-Cig and/or Vaping dev:  No


Substance use?:  No


Alcohol Use?:  No





Past Medical History


Surgery/Hospitalization HX:  


Gout, HTN, Hypothyroid, Hyperlipidemia, Osteoarthritis, Obesity


Gallbladder


High Cholesterol, Hypertension


Dementia


Renal Failure


Arthritis





Family Medical History


No Pertinent Family Hx





Physical Exam


Vital Signs





Vital Signs - First Documented








 7/27/23 7/27/23





 14:10 18:28


 


Temp 36.5 


 


Pulse 103 


 


Resp 18 


 


B/P (MAP) 155/96 (115) 


 


Pulse Ox 97 


 


O2 Delivery Room Air 


 


O2 Flow Rate  30.00





Capillary Refill :


Height, Weight, BMI


Height: '"


Weight: lbs. oz. kg; 38.16 BMI


Method:


General Appearance:  other (moaning)


HEENT:  PERRL/EOMI, other (L gaze deviation)


Neck:  non-tender, normal inspection


Respiratory:  chest non-tender, lungs clear, normal breath sounds, no 

respiratory distress, no accessory muscle use


Cardiovascular:  regular rate, rhythm, no murmur


Gastrointestinal:  normal bowel sounds, non tender, soft, no organomegaly


Extremities:  normal range of motion, normal inspection, normal capillary refill


Neurologic/Psychiatric:  alert, other (Left gaze deviation with apparent right-

sided facial droop.  Full neurologic exam is difficult as patient is not 

following commands.  She does withdraw all extremities to pain.)


Skin:  normal color, warm/dry





Procedures/Interventions


Reason for Intubation:  apnea, resp failure


Intubation Method:  orotracheal


Tube Size:  7.5


Medications:  Etomidate, Rocuronium


Positive End Tide CO2:  Yes


Breath Sounds after Intubation:  bilateral-equal


Intubation Complications:  no complications


Post Intubation Xray:  Yes


ET tube pulled back 2cm





Progress/Results/Core Measures


Results/Orders


Lab Results





Laboratory Tests








Test


 7/27/23


13:59 7/27/23


14:10 7/27/23


14:11 7/27/23


14:56 Range/Units


 


 


Lab Scanned Report LAB Reports      [id number]


 


White Blood Count


 


 5.0 


 


 


 4.3-11.0


10^3/uL


 


Red Blood Count


 


 4.87 


 


 


 3.80-5.11


10^6/uL


 


Hemoglobin  12.5 #   11.5-16.0  g/dL


 


Hematocrit  40    35-52  %


 


Mean Corpuscular Volume  82    80-99  fL


 


Mean Corpuscular Hemoglobin  26    25-34  pg


 


Mean Corpuscular Hemoglobin


Concent 


 31 L


 


 


 32-36  g/dL





 


Red Cell Distribution Width  20.6 H   10.0-14.5  %


 


Platelet Count


 


 280 


 


 


 130-400


10^3/uL


 


Mean Platelet Volume  10.3    9.0-12.2  fL


 


Immature Granulocyte % (Auto)  0     %


 


Neutrophils (%) (Auto)  53    42-75  %


 


Lymphocytes (%) (Auto)  33    12-44  %


 


Monocytes (%) (Auto)  13 H   0-12  %


 


Eosinophils (%) (Auto)  0    0-10  %


 


Basophils (%) (Auto)  1    0-10  %


 


Neutrophils # (Auto)  2.6    1.8-7.8  X 10^3


 


Lymphocytes # (Auto)  1.6    1.0-4.0  X 10^3


 


Monocytes # (Auto)  0.7    0.0-1.0  X 10^3


 


Eosinophils # (Auto)


 


 0.0 


 


 


 0.0-0.3


10^3/uL


 


Basophils # (Auto)


 


 0.0 


 


 


 0.0-0.1


10^3/uL


 


Immature Granulocyte # (Auto)


 


 0.0 


 


 


 0.0-0.1


10^3/uL


 


Prothrombin Time  19.3 H   12.2-14.7  SEC


 


INR Comment  1.6 H   0.8-1.4  


 


Activated Partial


Thromboplast Time 


 50 H


 


 


 24-35  SEC





 


Sodium Level  140    135-145  MMOL/L


 


Potassium Level  4.2    3.6-5.0  MMOL/L


 


Chloride Level  102      MMOL/L


 


Carbon Dioxide Level  26    21-32  MMOL/L


 


Anion Gap  12    5-14  MMOL/L


 


Blood Urea Nitrogen  9    7-18  MG/DL


 


Creatinine


 


 0.86 


 


 


 0.60-1.30


MG/DL


 


Estimat Glomerular Filtration


Rate 


 68 


 


 


  





 


BUN/Creatinine Ratio  10     


 


Glucose Level  101      MG/DL


 


Calcium Level  8.0 L   8.5-10.1  MG/DL


 


Corrected Calcium  9.0    8.5-10.1  MG/DL


 


Total Bilirubin  0.6    0.1-1.0  MG/DL


 


Aspartate Amino Transf


(AST/SGOT) 


 61 H


 


 


 5-34  U/L





 


Alanine Aminotransferase


(ALT/SGPT) 


 49 


 


 


 0-55  U/L





 


Alkaline Phosphatase  578 H     U/L


 


Troponin I  < 0.30    <0.30  NG/ML


 


Total Protein  6.3 L   6.4-8.2  GM/DL


 


Albumin  2.7 L   3.2-4.5  GM/DL


 


Glucometer   91     MG/DL


 


Urine Color    DK YELLOW   


 


Urine Clarity    CLOUDY   


 


Urine pH    5.5  5-9  


 


Urine Specific Gravity    1.015 L 1.016-1.022  


 


Urine Protein    1+ H NEGATIVE  


 


Urine Glucose (UA)    NEGATIVE  NEGATIVE  


 


Urine Ketones    NEGATIVE  NEGATIVE  


 


Urine Nitrite    POSITIVE H NEGATIVE  


 


Urine Bilirubin    NEGATIVE  NEGATIVE  


 


Urine Urobilinogen    0.2  < = 1.0  MG/DL


 


Urine Leukocyte Esterase    1+ H NEGATIVE  


 


Urine RBC (Auto)    3+ H NEGATIVE  


 


Urine RBC    >100 H  /HPF


 


Urine WBC     H  /HPF


 


Urine Squamous Epithelial


Cells 


 


 


 RARE 


  /HPF





 


Urine Crystals    NONE   /LPF


 


Urine Bacteria    LARGE H  /HPF


 


Urine Casts    NONE   /LPF


 


Urine Mucus    NONE   /LPF


 


Urine Culture Indicated    YES   


 


Test


 7/27/23


15:47 


 


 


 Range/Units


 


 


Blood Gas Puncture Site UNK      


 


Blood Gas Patient Temperature UNK      


 


Arterial Blood pH 7.42     7.37-7.43  


 


Arterial Blood Partial


Pressure CO2 38 


 


 


 


 35-45  MMHG





 


Arterial Blood Partial


Pressure O2 244 H


 


 


 


 79-93  MMHG





 


Arterial Blood HCO3 25     23-27  MMOL/L


 


Arterial Blood Total CO2


 25.8 


 


 


 


 21.0-31.0


MMOL/L


 


Arterial Blood Oxygen


Saturation 100 


 


 


 


   %





 


Arterial Blood Base Excess


 0.3 


 


 


 


 -2.5-2.5


MMOL/L


 


Abdirashid Test UNK      


 


Blood Gas Ventilator Setting NO      


 


Blood Gas Inspired Oxygen UNK      








Micro Results





Microbiology


7/27/23 Urine Culture - Preliminary, Resulted


          Probable Klebsiella/Enterobact





My Orders





Orders - DONNA HALL DO


Ct Head Wo-R/O Stroke (7/27/23 14:00)


Monitor-Rhythm Ecg Trace Only (7/27/23 14:00)


Vital Signs Stroke Patient Q15M (7/27/23 14:00)


Dysphagia Screening Tool Q10MX1 (7/27/23 14:00)


Cbc With Automated Diff (7/27/23 14:00)


Protime With Inr (7/27/23 14:00)


Partial Thromboplastin Time (7/27/23 14:00)


Comprehensive Metabolic Panel (7/27/23 14:00)


Troponin I Fs (7/27/23 14:00)


Ua Culture If Indicated (7/27/23 14:00)


Chest 1 View Ap/Pa Only (7/27/23 14:00)


Ekg Tracing (7/27/23 14:00)


Nothing By Mouth (7/27/23 Lunch)


Accucheck Stat ONCE (7/27/23 14:00)


Ed Iv/Invasive Line Start (7/27/23 14:00)


Vital Signs Stroke Patient Q15M (7/27/23 14:00)


O2 (7/27/23 14:00)


Intake & Output 06,14,22 (7/27/23 14:00)


Dysphagia Screening Tool Q10MX1 (7/27/23 14:00)


Ct Angio Head/Neck (7/27/23 14:10)


Iohexol Injection (Omnipaque 350 Mg/Ml 1 (7/27/23 14:45)


Di Iv Start (Assessment) .IV start (7/27/23 14:32)


Received Contrast (Hold Metformin- Contr (7/27/23 14:45)


Ns (Ivpb) 100 Ml (Sodium Chloride 0.9% 1 (7/27/23 14:45)


Urine Culture (7/27/23 14:56)


Ceftriaxone  Iv/Im (Rocephin  Iv/Im) (7/27/23 15:30)


Propofol Drip (Icu) (Diprivan Drip (Icu) (7/27/23 15:46)


Chest 1 View Ap/Pa Only (7/27/23 )


Arterial Blood Gas (7/27/23 15:55)


Levetiracetam Injection (Keppra Injectio (7/27/23 15:58)


Propofol Drip (Icu) (Diprivan Drip (Icu) (7/27/23 18:38)


Etomidate Injection (Amidate Injection) (7/27/23 14:00)


Rocuronium Injection (Zemuron Injection) (7/27/23 14:00)


Ns Iv 1000 Ml (Sodium Chloride 0.9%) (7/27/23 14:00)





Medications Given in ED





Vital Signs/I&O











 7/27/23 7/27/23 7/27/23





 14:10 15:48 18:28


 


Temp 36.5  


 


Pulse 103 91 89


 


Resp 18  20


 


B/P (MAP) 155/96 (115) 161/97 136/78


 


Pulse Ox 97  100


 


O2 Delivery Room Air  Mechanical Ventilator


 


O2 Flow Rate   30.00








Comment


EKG is poor quality with a wandering baseline due to patient tremor and 

inability to sit still but overall shows a sinus rhythm with a rate of 99 bpm.  

Normal intervals.  Apparently normal axis.  Right bundle branch block.  No ST or

T wave abnormalities.  No ectopy.  No STEMI





Critical Care Note


Critical Care


Total Time (minutes)


90, excludes procedures





Departure


Communication (Admissions)


I spoke to the patient's niece who is her listed power of  for 

healthcare decisions, Rodney Pereira ph [phone], she states the patient 

will be a full code.  The patient's not a candidate for TNKase as she is on 

Xarelto.  CT scan initially shows no obvious bleeding on my independent review. 

Awaiting radiology read.  Concern for stroke.  Went and ordered CTA head and 

neck as she would likely be a candidate for endovascular therapy if she has 

large vessel occlusion.  I spoke with her power of  about this and she 

states that she would want this for the patient if she is a candidate.  Other 

differential considerations include hypo-, hypernatremia, hypohyper-glycemia, 

infection causing altered mentation.





1505: Spoke to Dr Garrido  stroke neurology. She agrees patient not 

candidate for lytic therpay. CT angio head/neck show no evidence for large 

vessel occlusion. Not a candidate for endovascular therapy. She reccomends 

admission and MRI. Would recommend EEG at some point but states no indication 

for transfer for this. 





1510: Pending call back from Dr Tripp for admission





1520: Called to room for resp failure.  Her niece reports that she looked to the

right briefly and then stopped breathing.  She did have some jerking type 

movements prior to this.  She was emergently intubated without complication.  

Her oxygen saturation remained greater than 90 throughout the procedure.  Her 

pulse remained strong and heart rate was in the 80-90 range and blood pressures 

were normal.  I spoke with Dr. Meyers once again and she request transfer to [facility] due to the complexity of the case.  I think she may be having 

seizures, status epilepticus.  With  neurology, Dr. Schaefer 1 more time 

and she is speaking with the neuro intensivist at this time.  They recommend 2 g

of Keppra be loaded.  She does have a urinary tract infection so started 

Rocephin.  This is likely not contributing to her current clinical presentation.





Impression





   Primary Impression:  


   Left-sided weakness


   Additional Impressions:  


   UTI (urinary tract infection)


   Qualified Codes:  N30.00 - Acute cystitis without hematuria


   Respiratory failure


   Qualified Codes:  J96.01 - Acute respiratory failure with hypoxia; J96.02 - 

   Acute respiratory failure with hypercapnia


Disposition:  02 XFER SHT-TRM HOSP


Condition:  Stable





Departure-Patient Inst.


Referrals:  


BASIL JOSEPH (PCP)


Primary Care Physician








[facility] (Family)


Primary Care Physician











DONNA HALL DO            Jul 27, 2023 14:16